# Patient Record
Sex: FEMALE | Race: WHITE | NOT HISPANIC OR LATINO | Employment: OTHER | URBAN - NONMETROPOLITAN AREA
[De-identification: names, ages, dates, MRNs, and addresses within clinical notes are randomized per-mention and may not be internally consistent; named-entity substitution may affect disease eponyms.]

---

## 2021-08-12 ENCOUNTER — OFFICE VISIT (OUTPATIENT)
Dept: CARDIOLOGY | Facility: CLINIC | Age: 81
End: 2021-08-12

## 2021-08-12 VITALS
HEART RATE: 70 BPM | WEIGHT: 156 LBS | HEIGHT: 60 IN | BODY MASS INDEX: 30.63 KG/M2 | SYSTOLIC BLOOD PRESSURE: 142 MMHG | DIASTOLIC BLOOD PRESSURE: 80 MMHG

## 2021-08-12 DIAGNOSIS — E88.81 METABOLIC SYNDROME: ICD-10-CM

## 2021-08-12 DIAGNOSIS — I25.6 SILENT MYOCARDIAL ISCHEMIA: ICD-10-CM

## 2021-08-12 DIAGNOSIS — I10 ESSENTIAL HYPERTENSION: ICD-10-CM

## 2021-08-12 DIAGNOSIS — R06.02 SHORTNESS OF BREATH: Primary | ICD-10-CM

## 2021-08-12 DIAGNOSIS — E78.00 HYPERCHOLESTEREMIA: ICD-10-CM

## 2021-08-12 DIAGNOSIS — R53.82 CHRONIC FATIGUE: ICD-10-CM

## 2021-08-12 PROBLEM — E88.810 METABOLIC SYNDROME: Status: ACTIVE | Noted: 2021-08-12

## 2021-08-12 PROCEDURE — 99204 OFFICE O/P NEW MOD 45 MIN: CPT | Performed by: INTERNAL MEDICINE

## 2021-08-12 PROCEDURE — 93000 ELECTROCARDIOGRAM COMPLETE: CPT | Performed by: INTERNAL MEDICINE

## 2021-08-12 RX ORDER — ATENOLOL 50 MG/1
TABLET ORAL
COMMUNITY

## 2021-08-12 RX ORDER — TRAMADOL HYDROCHLORIDE 50 MG/1
50 TABLET ORAL EVERY 6 HOURS PRN
COMMUNITY

## 2021-08-12 RX ORDER — ASPIRIN 81 MG/1
81 TABLET ORAL DAILY
Qty: 30 TABLET | Refills: 6 | Status: SHIPPED | OUTPATIENT
Start: 2021-08-12

## 2021-08-12 RX ORDER — DICLOFENAC SODIUM 75 MG/1
75 TABLET, DELAYED RELEASE ORAL 2 TIMES DAILY
COMMUNITY

## 2021-08-12 RX ORDER — FLUTICASONE PROPIONATE 50 MCG
2 SPRAY, SUSPENSION (ML) NASAL DAILY
COMMUNITY

## 2021-08-12 RX ORDER — LISINOPRIL AND HYDROCHLOROTHIAZIDE 25; 20 MG/1; MG/1
1 TABLET ORAL DAILY
COMMUNITY

## 2021-08-12 RX ORDER — GABAPENTIN 300 MG/1
300 CAPSULE ORAL 3 TIMES DAILY
COMMUNITY

## 2021-08-12 RX ORDER — LANSOPRAZOLE 30 MG/1
30 CAPSULE, DELAYED RELEASE ORAL DAILY
COMMUNITY

## 2021-08-12 RX ORDER — MULTIPLE VITAMINS W/ MINERALS TAB 9MG-400MCG
1 TAB ORAL DAILY
COMMUNITY

## 2021-08-12 RX ORDER — AMLODIPINE BESYLATE 5 MG/1
5 TABLET ORAL DAILY
COMMUNITY

## 2021-08-12 RX ORDER — DULOXETIN HYDROCHLORIDE 60 MG/1
60 CAPSULE, DELAYED RELEASE ORAL DAILY
COMMUNITY

## 2021-08-12 RX ORDER — LUTEIN 6 MG
TABLET ORAL DAILY
COMMUNITY

## 2021-08-12 NOTE — PROGRESS NOTES
Chief Complaint   Patient presents with   • Establish Care     PCP referred, SOB, weakness   • Shortness of Breath     with exertion, at times unable to do her chores due to the SOB, seems worse over the past few months.    • Cardiac history     stress, echo and cath, said it was done due to something hereditary her sister had involving her renal arteries 20 + years ago, was told no blockages. Unable to get copy that old.    • Stress     pt's sister had been sick and recently passed away. Feels symptoms could be related to stress/ depression.    • Labs     PCP recently checked, results in chart.    • Fatigue     extremely fatigued with minimal exertion        CARDIAC COMPLAINTS  dyspnea and fatigue      Subjective   Stefany Tellez is a 80 y.o. female came in today for her initial cardiac evaluation.  She has history of hypertension who has been under significant stress recently.  Her sister was diagnosed with Alzheimer's disease about 2 years ago and apparently deteriorated very rapidly and recently passed away.  She was taking care of her as follows her sister's  also.  She was under tremendous stress during this time and gained weight.  She started noticing shortness of breath with exertion.  It used to be on moderate exertion in the past but now in the last few months she noticed it and minimal exertion.  She is not able to do her day-to-day activities.  She also has been noticing extreme fatigue even on minimal exertion.  She denies having any chest pain but she has to stop doing anything for few minutes till she is able to proceed.  She had cardiac work-up almost 20 years ago.  At that time her sister had what appears to be renal artery dysplasia and underwent some procedures.  She was told to undergo cardiac catheterization at that time and according to her everything was normal.  She has not been followed by any cardiologist after that she did undergo some lab work recently and found to have a blood sugar  of 113.  Her renal function was normal at 74.  Her electrolytes were normal.  The total cholesterol was 161 but the LDL was 102 with an HDL of 28.  Triglyceride was 176 her TSH was normal.  Her blood count was normal.  Her platelets were mildly reduced.  She used to smoke a pack a day for 15 years but quit in .  Her mother  with hypertension and also lymphoma.  Her father  with heart disease at age of 70.  Her sister as stated above  recently with Alzheimer's disease.    History reviewed. No pertinent surgical history.    Current Outpatient Medications   Medication Sig Dispense Refill   • amLODIPine (NORVASC) 5 MG tablet Take 5 mg by mouth Daily.     • atenolol (TENORMIN) 50 MG tablet 1 1/2 tabs twice a day     • diclofenac (VOLTAREN) 75 MG EC tablet Take 75 mg by mouth 2 (Two) Times a Day.     • DULoxetine (CYMBALTA) 60 MG capsule Take 60 mg by mouth Daily.     • fluticasone (FLONASE) 50 MCG/ACT nasal spray 2 sprays into the nostril(s) as directed by provider Daily.     • gabapentin (NEURONTIN) 300 MG capsule Take 300 mg by mouth 3 (Three) Times a Day.     • lansoprazole (PREVACID) 30 MG capsule Take 30 mg by mouth Daily.     • lisinopril-hydrochlorothiazide (PRINZIDE,ZESTORETIC) 20-25 MG per tablet Take 1 tablet by mouth Daily.     • Lutein 20 MG tablet Take  by mouth Daily.     • multivitamin with minerals tablet tablet Take 1 tablet by mouth Daily.     • traMADol (ULTRAM) 50 MG tablet Take 50 mg by mouth Every 6 (Six) Hours As Needed for Moderate Pain .     • aspirin (aspirin) 81 MG EC tablet Take 1 tablet by mouth Daily. 30 tablet 6     No current facility-administered medications for this visit.           ALLERGIES:  Codeine    Past Medical History:   Diagnosis Date   • Arthritis    • Asthma    • Chronic bronchitis (CMS/HCC)    • Depression    • Diverticulitis    • Fibromyalgia    • GERD (gastroesophageal reflux disease)    • History of back surgery    • History of bilateral knee replacement   "  • History of cholecystectomy    • History of colonoscopy with polypectomy    • History of hysterectomy    • History of tonsillectomy    • Hyperlipidemia    • Hypertension    • Migraines        Social History     Tobacco Use   Smoking Status Former Smoker   • Packs/day: 1.00   • Years: 15.00   • Pack years: 15.   • Types: Cigarettes   • Quit date:    • Years since quittin.6   Smokeless Tobacco Never Used          Family History   Problem Relation Age of Onset   • Hypertension Mother    • Lymphoma Mother    • Heart attack Father    • Heart disease Father    • Alzheimer's disease Sister    • Mitral valve prolapse Sister    • Other Sister         renal artery stenosis   • Heart disease Maternal Grandfather    • No Known Problems Son    • No Known Problems Daughter        Review of Systems   Constitutional: Positive for malaise/fatigue. Negative for decreased appetite.   HENT: Negative for congestion and sore throat.    Eyes: Negative for blurred vision.   Cardiovascular: Positive for dyspnea on exertion. Negative for chest pain.   Respiratory: Positive for shortness of breath and snoring.    Endocrine: Negative for cold intolerance and heat intolerance.   Hematologic/Lymphatic: Negative for adenopathy. Does not bruise/bleed easily.   Skin: Negative for itching, nail changes and skin cancer.   Musculoskeletal: Positive for back pain and joint pain. Negative for arthritis and myalgias.   Gastrointestinal: Negative for abdominal pain, dysphagia and heartburn.   Genitourinary: Negative for bladder incontinence and frequency.   Neurological: Negative for dizziness, light-headedness, seizures and vertigo.   Psychiatric/Behavioral: Negative for altered mental status.   Allergic/Immunologic: Negative for environmental allergies and hives.       Diabetes- No  Thyroid- normal    Objective     /80 (BP Location: Right arm)   Pulse 70   Ht 152.4 cm (60\")   Wt 70.8 kg (156 lb)   BMI 30.47 kg/m²     Vitals and " nursing note reviewed.   Constitutional:       Appearance: Healthy appearance. Not in distress.   Eyes:      Conjunctiva/sclera: Conjunctivae normal.      Pupils: Pupils are equal, round, and reactive to light.   HENT:      Head: Normocephalic.   Pulmonary:      Effort: Pulmonary effort is normal.      Breath sounds: Normal breath sounds.   Cardiovascular:      PMI at left midclavicular line. Normal rate. Regular rhythm.      Murmurs: There is a grade 3/6 high frequency blowing holosystolic murmur at the apex.   Edema:     Ankle: bilateral trace edema of the ankle.     Feet: bilateral trace edema of the feet.  Abdominal:      General: Bowel sounds are normal.      Palpations: Abdomen is soft.   Musculoskeletal: Normal range of motion.      Cervical back: Normal range of motion and neck supple. Skin:     General: Skin is warm and dry.   Neurological:      Mental Status: Alert, oriented to person, place, and time and oriented to person, place and time.           ECG 12 Lead    Date/Time: 8/12/2021 1:22 PM  Performed by: Darryl Kraus MD  Authorized by: Darryl Kraus MD   Previous ECG: no previous ECG available  Rhythm: sinus rhythm  Rate: normal  QRS axis: normal    Clinical impression: normal ECG              Assessment/Plan   Patient's Body mass index is 30.47 kg/m². indicating that she is obese (BMI >30). Obesity-related health conditions include the following: hypertension and dyslipidemias. Obesity is newly identified. BMI is is above average; BMI management plan is completed. We discussed low calorie, low carb based diet program, portion control and increasing exercise..     Diagnoses and all orders for this visit:    1. Shortness of breath (Primary)  -     Stress Test With Myocardial Perfusion One Day; Future  -     Adult Transthoracic Echo Complete W/ Cont if Necessary Per Protocol; Future    2. Metabolic syndrome    3. Chronic fatigue  -     Overnight Sleep Oximetry Study; Future    4. Essential  hypertension    5. Hypercholesteremia    6. Silent myocardial ischemia  -     aspirin (aspirin) 81 MG EC tablet; Take 1 tablet by mouth Daily.  Dispense: 30 tablet; Refill: 6  -     Stress Test With Myocardial Perfusion One Day; Future    At baseline her heart rate is stable.  Her blood pressure is borderline elevated.  Her EKG shows normal sinus rhythm with sinus arrhythmia.  Her clinical examination reveals a BMI of 31.  She does have a short systolic murmur of 3/6 at the mitral area.  She does have trace pedal edema.    Regarding the shortness of breath which is the predominant symptom, it could be secondary to her weight but need to rule out other causes including cardiac.  I scheduled her to undergo an echocardiogram to evaluate the LV function, valvular structures, any wall motion abnormalities suggestive of ischemic heart disease and also evaluate for any pericardial effusion.    Regarding her metabolic syndrome, had a long talk with her about diet and weight reduction.  I did give her papers about Mediterranean diet.  I talked to her about cutting down on the carbohydrate intake.    Regarding the chronic fatigue she has, it could be related to the metabolic syndrome causing her to have sleep apnea.  I advised her to undergo a nocturnal pulse PO2 measurement.  If there is significant hypoxia, she may need for sleep study    Regarding her hypertension, it seems to be controlled with amlodipine, Prinzide and Tenormin.  She does have a borderline elevation at this time which could be secondary to her anxiety.  Will continue to monitor    Regarding her hypercholesterolemia, it is only borderline elevated.  I had a long talk with her about it.  If she can go on a strict diet then we should be able to control it with diet alone.  If she is not able to control it then she may need a statin    Regarding the possibility of silent ischemia, I talked to her about ischemic heart disease presentation in women.  I did talk  to her about the increasing shortness of breath and fatigue could be related to that also.  I scheduled her to undergo a stress test in the form of Lexiscan to rule it out.  Meanwhile I did advise her to be on an aspirin 81 mg once a day.    Regarding her advanced directive, she apparently does have a living will and power of .  I did advise her to give us a copy.    Depending on the findings, further recommendations will be made.               Electronically signed by Darryl Kraus MD August 12, 2021 13:01 EDT

## 2021-08-12 NOTE — PATIENT INSTRUCTIONS
Mediterranean Diet  A Mediterranean diet refers to food and lifestyle choices that are based on the traditions of countries located on the [place]. This way of eating has been shown to help prevent certain conditions and improve outcomes for people who have chronic diseases, like kidney disease and heart disease.  What are tips for following this plan?  Lifestyle  · Cook and eat meals together with your family, when possible.  · Drink enough fluid to keep your urine clear or pale yellow.  · Be physically active every day. This includes:  ? Aerobic exercise like running or swimming.  ? Leisure activities like gardening, walking, or housework.  · Get 7-8 hours of sleep each night.  · If recommended by your health care provider, drink red wine in moderation. This means 1 glass a day for nonpregnant women and 2 glasses a day for men. A glass of wine equals 5 oz (150 mL).  Reading food labels    · Check the serving size of packaged foods. For foods such as rice and pasta, the serving size refers to the amount of cooked product, not dry.  · Check the total fat in packaged foods. Avoid foods that have saturated fat or trans fats.  · Check the ingredients list for added sugars, such as corn syrup.  Shopping  · At the grocery store, buy most of your food from the areas near the walls of the store. This includes:  ? Fresh fruits and vegetables (produce).  ? Grains, beans, nuts, and seeds. Some of these may be available in unpackaged forms or large amounts (in bulk).  ? Fresh seafood.  ? Poultry and eggs.  ? Low-fat dairy products.  · Buy whole ingredients instead of prepackaged foods.  · Buy fresh fruits and vegetables in-season from local farmers markets.  · Buy frozen fruits and vegetables in resealable bags.  · If you do not have access to quality fresh seafood, buy precooked frozen shrimp or canned fish, such as tuna, salmon, or sardines.  · Buy small amounts of raw or cooked vegetables, salads, or olives from  the deli or salad bar at your store.  · Stock your pantry so you always have certain foods on hand, such as olive oil, canned tuna, canned tomatoes, rice, pasta, and beans.  Cooking  · Cook foods with extra-virgin olive oil instead of using butter or other vegetable oils.  · Have meat as a side dish, and have vegetables or grains as your main dish. This means having meat in small portions or adding small amounts of meat to foods like pasta or stew.  · Use beans or vegetables instead of meat in common dishes like chili or lasagna.  · [place] with different cooking methods. Try roasting or broiling vegetables instead of steaming or sautéeing them.  · Add frozen vegetables to soups, stews, pasta, or rice.  · Add nuts or seeds for added healthy fat at each meal. You can add these to yogurt, salads, or vegetable dishes.  · Marinate fish or vegetables using olive oil, lemon juice, garlic, and fresh herbs.  Meal planning    · Plan to eat 1 vegetarian meal one day each week. Try to work up to 2 vegetarian meals, if possible.  · Eat seafood 2 or more times a week.  · Have healthy snacks readily available, such as:  ? Vegetable sticks with hummus.  ? Greek yogurt.  ? Fruit and nut trail mix.  · Eat balanced meals throughout the week. This includes:  ? Fruit: 2-3 servings a day  ? Vegetables: 4-5 servings a day  ? Low-fat dairy: 2 servings a day  ? Fish, poultry, or lean meat: 1 serving a day  ? Beans and legumes: 2 or more servings a week  ? Nuts and seeds: 1-2 servings a day  ? Whole grains: 6-8 servings a day  ? Extra-virgin olive oil: 3-4 servings a day  · Limit red meat and sweets to only a few servings a month  What are my food choices?  · Mediterranean diet  ? Recommended  § Grains: Whole-grain pasta. Brown rice. Bulgar wheat. Polenta. Couscous. Whole-wheat bread. Oatmeal. Quinoa.  § Vegetables: Artichokes. Beets. Broccoli. Cabbage. Carrots. Eggplant. Green beans. Chard. Kale. Spinach. Onions. Leeks. Peas. Squash.  Tomatoes. Peppers. Radishes.  § Fruits: Apples. Apricots. Avocado. Berries. Bananas. Cherries. Dates. Figs. Grapes. Otilia. Melon. Oranges. Peaches. Plums. Pomegranate.  § Meats and other protein foods: Beans. Almonds. Sunflower seeds. Pine nuts. Peanuts. Cod. [place]. Scallops. Shrimp. Tuna. Tilapia. Clams. Oysters. Eggs.  § Dairy: Low-fat milk. Cheese. Greek yogurt.  § Beverages: Water. Red wine. Herbal tea.  § Fats and oils: Extra virgin olive oil. Avocado oil. Grape seed oil.  § Sweets and desserts: Greek yogurt with honey. Baked apples. Poached pears. Trail mix.  § Seasoning and other foods: Basil. Cilantro. Coriander. Cumin. Mint. Parsley. [place]. Rosemary. Tarragon. Garlic. Oregano. Thyme. Pepper. Balsalmic vinegar. Tahini. Hummus. Tomato sauce. Olives. Mushrooms.  ? Limit these  § Grains: Prepackaged pasta or rice dishes. Prepackaged cereal with added sugar.  § Vegetables: Deep fried potatoes (french fries).  § Fruits: Fruit canned in syrup.  § Meats and other protein foods: Beef. Pork. Lamb. Poultry with skin. Hot dogs. [place].  § Dairy: Ice cream. Sour cream. Whole milk.  § Beverages: Juice. Sugar-sweetened soft drinks. Beer. Liquor and spirits.  § Fats and oils: Butter. Canola oil. Vegetable oil. Beef fat (tallow). Lard.  § Sweets and desserts: Cookies. Cakes. Pies. Candy.  § Seasoning and other foods: Mayonnaise. Premade sauces and marinades.  The items listed may not be a complete list. Talk with your dietitian about what dietary choices are right for you.  Summary  · The Mediterranean diet includes both food and lifestyle choices.  · Eat a variety of fresh fruits and vegetables, beans, nuts, seeds, and whole grains.  · Limit the amount of red meat and sweets that you eat.  · Talk with your health care provider about whether it is safe for you to drink red wine in moderation. This means 1 glass a day for nonpregnant women and 2 glasses a day for men. A glass of wine equals 5 oz (150 mL).  This information  is not intended to replace advice given to you by your health care provider. Make sure you discuss any questions you have with your health care provider.  Document Revised: 08/17/2017 Document Reviewed: 08/10/2017  Elsevier Patient Education © 2020 [employer]

## 2021-09-13 ENCOUNTER — APPOINTMENT (OUTPATIENT)
Dept: CARDIOLOGY | Facility: HOSPITAL | Age: 81
End: 2021-09-13

## 2021-10-18 ENCOUNTER — APPOINTMENT (OUTPATIENT)
Dept: CARDIOLOGY | Facility: HOSPITAL | Age: 81
End: 2021-10-18

## 2021-11-03 ENCOUNTER — HOSPITAL ENCOUNTER (OUTPATIENT)
Dept: CARDIOLOGY | Facility: HOSPITAL | Age: 81
Discharge: HOME | End: 2021-11-03
Admitting: INTERNAL MEDICINE

## 2021-11-03 DIAGNOSIS — R06.02 SHORTNESS OF BREATH: ICD-10-CM

## 2021-11-03 LAB
BH CV ECHO MEAS - ACS: 2.2 CM
BH CV ECHO MEAS - AI DEC SLOPE: 184 CM/SEC^2
BH CV ECHO MEAS - AI MAX PG: 47.1 MMHG
BH CV ECHO MEAS - AI MAX VEL: 343 CM/SEC
BH CV ECHO MEAS - AI P1/2T: 546 MSEC
BH CV ECHO MEAS - AO MAX PG: 5.9 MMHG
BH CV ECHO MEAS - AO MEAN PG: 3 MMHG
BH CV ECHO MEAS - AO ROOT AREA (BSA CORRECTED): 1.7
BH CV ECHO MEAS - AO ROOT AREA: 6.4 CM^2
BH CV ECHO MEAS - AO ROOT DIAM: 2.9 CM
BH CV ECHO MEAS - AO V2 MAX: 121 CM/SEC
BH CV ECHO MEAS - AO V2 MEAN: 86.3 CM/SEC
BH CV ECHO MEAS - AO V2 VTI: 27.1 CM
BH CV ECHO MEAS - BSA(HAYCOCK): 1.8 M^2
BH CV ECHO MEAS - BSA: 1.7 M^2
BH CV ECHO MEAS - BZI_BMI: 30.5 KILOGRAMS/M^2
BH CV ECHO MEAS - BZI_METRIC_HEIGHT: 152.4 CM
BH CV ECHO MEAS - BZI_METRIC_WEIGHT: 70.8 KG
BH CV ECHO MEAS - EDV(CUBED): 59.8 ML
BH CV ECHO MEAS - EDV(MOD-SP4): 58.6 ML
BH CV ECHO MEAS - EDV(TEICH): 66.3 ML
BH CV ECHO MEAS - EF(CUBED): 82.2 %
BH CV ECHO MEAS - EF(MOD-SP4): 59.4 %
BH CV ECHO MEAS - EF(TEICH): 75.6 %
BH CV ECHO MEAS - ESV(CUBED): 10.6 ML
BH CV ECHO MEAS - ESV(MOD-SP4): 23.8 ML
BH CV ECHO MEAS - ESV(TEICH): 16.2 ML
BH CV ECHO MEAS - FS: 43.7 %
BH CV ECHO MEAS - IVS/LVPW: 1.2
BH CV ECHO MEAS - IVSD: 1.4 CM
BH CV ECHO MEAS - LA DIMENSION: 3.5 CM
BH CV ECHO MEAS - LA/AO: 1.2
BH CV ECHO MEAS - LV DIASTOLIC VOL/BSA (35-75): 34.9 ML/M^2
BH CV ECHO MEAS - LV IVRT: 0.14 SEC
BH CV ECHO MEAS - LV MASS(C)D: 183.6 GRAMS
BH CV ECHO MEAS - LV MASS(C)DI: 109.3 GRAMS/M^2
BH CV ECHO MEAS - LV SYSTOLIC VOL/BSA (12-30): 14.2 ML/M^2
BH CV ECHO MEAS - LVIDD: 3.9 CM
BH CV ECHO MEAS - LVIDS: 2.2 CM
BH CV ECHO MEAS - LVLD AP4: 6 CM
BH CV ECHO MEAS - LVLS AP4: 5.1 CM
BH CV ECHO MEAS - LVOT AREA (M): 3.1 CM^2
BH CV ECHO MEAS - LVOT AREA: 3.1 CM^2
BH CV ECHO MEAS - LVOT DIAM: 2 CM
BH CV ECHO MEAS - LVPWD: 1.2 CM
BH CV ECHO MEAS - MV A MAX VEL: 83.1 CM/SEC
BH CV ECHO MEAS - MV DEC SLOPE: 157 CM/SEC^2
BH CV ECHO MEAS - MV E MAX VEL: 48.8 CM/SEC
BH CV ECHO MEAS - MV E/A: 0.59
BH CV ECHO MEAS - RAP SYSTOLE: 10 MMHG
BH CV ECHO MEAS - RVDD: 2.9 CM
BH CV ECHO MEAS - RVSP: 35.8 MMHG
BH CV ECHO MEAS - SI(AO): 102.9 ML/M^2
BH CV ECHO MEAS - SI(CUBED): 29.3 ML/M^2
BH CV ECHO MEAS - SI(MOD-SP4): 20.7 ML/M^2
BH CV ECHO MEAS - SI(TEICH): 29.8 ML/M^2
BH CV ECHO MEAS - SV(AO): 172.9 ML
BH CV ECHO MEAS - SV(CUBED): 49.1 ML
BH CV ECHO MEAS - SV(MOD-SP4): 34.8 ML
BH CV ECHO MEAS - SV(TEICH): 50.1 ML
BH CV ECHO MEAS - TR MAX VEL: 254 CM/SEC
MAXIMAL PREDICTED HEART RATE: 140 BPM
STRESS TARGET HR: 119 BPM

## 2021-11-03 PROCEDURE — 93306 TTE W/DOPPLER COMPLETE: CPT

## 2021-11-03 PROCEDURE — 93306 TTE W/DOPPLER COMPLETE: CPT | Performed by: INTERNAL MEDICINE

## 2021-11-08 ENCOUNTER — HOSPITAL ENCOUNTER (OUTPATIENT)
Dept: CARDIOLOGY | Facility: HOSPITAL | Age: 81
Discharge: HOME | End: 2021-11-08

## 2021-11-08 ENCOUNTER — HOSPITAL ENCOUNTER (OUTPATIENT)
Dept: CARDIOLOGY | Facility: HOSPITAL | Age: 81
End: 2021-11-08

## 2021-11-08 DIAGNOSIS — I25.6 SILENT MYOCARDIAL ISCHEMIA: ICD-10-CM

## 2021-11-08 DIAGNOSIS — R06.02 SHORTNESS OF BREATH: ICD-10-CM

## 2021-11-08 PROCEDURE — 78452 HT MUSCLE IMAGE SPECT MULT: CPT

## 2021-11-08 PROCEDURE — 93017 CV STRESS TEST TRACING ONLY: CPT

## 2021-11-08 PROCEDURE — 78452 HT MUSCLE IMAGE SPECT MULT: CPT | Performed by: INTERNAL MEDICINE

## 2021-11-08 PROCEDURE — 0 TECHNETIUM SESTAMIBI: Performed by: INTERNAL MEDICINE

## 2021-11-08 PROCEDURE — 93018 CV STRESS TEST I&R ONLY: CPT | Performed by: INTERNAL MEDICINE

## 2021-11-08 PROCEDURE — A9500 TC99M SESTAMIBI: HCPCS | Performed by: INTERNAL MEDICINE

## 2021-11-08 RX ADMIN — TECHNETIUM TC 99M SESTAMIBI 1 DOSE: 1 INJECTION INTRAVENOUS at 09:13

## 2021-11-10 ENCOUNTER — HOSPITAL ENCOUNTER (OUTPATIENT)
Dept: CARDIOLOGY | Facility: HOSPITAL | Age: 81
Discharge: HOME | End: 2021-11-10

## 2021-11-10 LAB
BH CV REST NUCLEAR ISOTOPE DOSE: 20 MCI
BH CV STRESS COMMENTS STAGE 1: NORMAL
BH CV STRESS DOSE REGADENOSON STAGE 1: 0.4
BH CV STRESS DURATION MIN STAGE 1: 0
BH CV STRESS DURATION SEC STAGE 1: 10
BH CV STRESS NUCLEAR ISOTOPE DOSE: 20 MCI
BH CV STRESS PROTOCOL 1: NORMAL
BH CV STRESS RECOVERY BP: NORMAL MMHG
BH CV STRESS RECOVERY HR: 68 BPM
BH CV STRESS STAGE 1: 1
LV EF NUC BP: 61 %
MAXIMAL PREDICTED HEART RATE: 140 BPM
PERCENT MAX PREDICTED HR: 57.14 %
STRESS BASELINE BP: NORMAL MMHG
STRESS BASELINE HR: 63 BPM
STRESS PERCENT HR: 67 %
STRESS POST PEAK BP: NORMAL MMHG
STRESS POST PEAK HR: 80 BPM
STRESS TARGET HR: 119 BPM

## 2021-11-10 PROCEDURE — 0 TECHNETIUM SESTAMIBI: Performed by: INTERNAL MEDICINE

## 2021-11-10 PROCEDURE — A9500 TC99M SESTAMIBI: HCPCS | Performed by: INTERNAL MEDICINE

## 2021-11-10 PROCEDURE — 25010000002 REGADENOSON 0.4 MG/5ML SOLUTION: Performed by: INTERNAL MEDICINE

## 2021-11-10 RX ADMIN — REGADENOSON 0.4 MG: 0.08 INJECTION, SOLUTION INTRAVENOUS at 10:26

## 2021-11-10 RX ADMIN — TECHNETIUM TC 99M SESTAMIBI 1 DOSE: 1 INJECTION INTRAVENOUS at 10:26

## 2021-11-15 DIAGNOSIS — R94.39 ABNORMAL MYOCARDIAL PERFUSION STUDY: Primary | ICD-10-CM

## 2021-11-15 DIAGNOSIS — R06.02 SHORTNESS OF BREATH: ICD-10-CM

## 2021-11-29 ENCOUNTER — OUTSIDE FACILITY SERVICE (OUTPATIENT)
Dept: CARDIOLOGY | Facility: CLINIC | Age: 81
End: 2021-11-29

## 2021-11-29 PROCEDURE — 93458 L HRT ARTERY/VENTRICLE ANGIO: CPT | Performed by: INTERNAL MEDICINE

## 2021-11-30 DIAGNOSIS — R94.39 ABNORMAL MYOCARDIAL PERFUSION STUDY: ICD-10-CM

## 2021-11-30 DIAGNOSIS — R06.02 SHORTNESS OF BREATH: ICD-10-CM

## 2021-12-07 ENCOUNTER — TELEPHONE (OUTPATIENT)
Dept: CARDIOLOGY | Facility: CLINIC | Age: 81
End: 2021-12-07

## 2021-12-07 NOTE — TELEPHONE ENCOUNTER
Patient called, she has noticed a minor rash like area <1/2 inch, close to her cath site.  Upon discussing it, she feels like it is minor skin irritation from not wearing her Depends a few nights.  She denies redness around cath site and no warmth. No bruising.  I advised her to call if it persists or gets worse.

## 2022-01-10 ENCOUNTER — OFFICE VISIT (OUTPATIENT)
Dept: CARDIOLOGY | Facility: CLINIC | Age: 82
End: 2022-01-10

## 2022-01-10 VITALS
HEART RATE: 72 BPM | WEIGHT: 163.2 LBS | BODY MASS INDEX: 32.04 KG/M2 | DIASTOLIC BLOOD PRESSURE: 78 MMHG | SYSTOLIC BLOOD PRESSURE: 130 MMHG | TEMPERATURE: 97.6 F | HEIGHT: 60 IN

## 2022-01-10 DIAGNOSIS — I25.810 CORONARY ARTERY DISEASE INVOLVING CORONARY BYPASS GRAFT OF NATIVE HEART WITHOUT ANGINA PECTORIS: ICD-10-CM

## 2022-01-10 DIAGNOSIS — R06.02 SHORTNESS OF BREATH: ICD-10-CM

## 2022-01-10 DIAGNOSIS — I10 ESSENTIAL HYPERTENSION: Primary | ICD-10-CM

## 2022-01-10 DIAGNOSIS — E88.81 METABOLIC SYNDROME: ICD-10-CM

## 2022-01-10 PROCEDURE — 99213 OFFICE O/P EST LOW 20 MIN: CPT | Performed by: NURSE PRACTITIONER

## 2022-01-10 NOTE — PROGRESS NOTES
"Chief Complaint   Patient presents with   • Hospital follow up     Patient had cardiac cath 11/29/21 with medical management.   • Shortness of Breath     Has noticed increase in the last week, states \"it just seems to come and go\".   • Med Refill     PCP writes refills.     Subjective       Stefany Tellez is a 81 y.o. female with HTN, mild dyslipidemia, and significant arthritis who was referred for cardiac evaluation in August 2021. She had been under tremendous stress caring for her sister who rapidly deteriorated with Alzheimer's then cared for her sister's  who passed away shortly after. She denied having any chest pain but admitted to increasing shortness of breath on minimal exertion and extreme fatigue. Labs showed glucose 113, GFR 74, normal TSH, , , HDL 28, Tri 176.     Lexiscan stress test showed normal EF, questionable anterior wall ischemia. Echo revealed mild MR, mild AI, EF 65%, RVSP 36 mmHg. Cardiac cath was advised and revealed 65-70% of D1 and D2 which were 1mm vessels, too small for intervention. Medical management advised.     She returns today for follow up. Symptoms improved. She continues to have intermittent dyspnea but improved now that her stressors are lessened. Blood pressure well controlled. Activity is limited secondary to arthritis, difficulty ambulating well.         Cardiac History:    Past Surgical History:   Procedure Laterality Date   • CARDIAC CATHETERIZATION  11/29/2021    65-70% Small D! & D2   • CARDIOVASCULAR STRESS TEST  11/10/2021    Lexiscan- EF 61%. R/O Anterior Ischemia   • ECHO - CONVERTED  11/03/2021    EF 65%. Mild MR & AI. RVSP- 36 mmHg     Current Outpatient Medications   Medication Sig Dispense Refill   • amLODIPine (NORVASC) 5 MG tablet Take 5 mg by mouth Daily.     • aspirin (aspirin) 81 MG EC tablet Take 1 tablet by mouth Daily. 30 tablet 6   • atenolol (TENORMIN) 50 MG tablet 1 1/2 tabs twice a day     • diclofenac (VOLTAREN) 75 MG EC tablet Take " 75 mg by mouth 2 (Two) Times a Day.     • DULoxetine (CYMBALTA) 60 MG capsule Take 60 mg by mouth Daily.     • fluticasone (FLONASE) 50 MCG/ACT nasal spray 2 sprays into the nostril(s) as directed by provider Daily.     • gabapentin (NEURONTIN) 300 MG capsule Take 300 mg by mouth 3 (Three) Times a Day.     • lansoprazole (PREVACID) 30 MG capsule Take 30 mg by mouth Daily.     • lisinopril-hydrochlorothiazide (PRINZIDE,ZESTORETIC) 20-25 MG per tablet Take 1 tablet by mouth Daily.     • Lutein 20 MG tablet Take  by mouth Daily.     • multivitamin with minerals tablet tablet Take 1 tablet by mouth Daily.     • traMADol (ULTRAM) 50 MG tablet Take 50 mg by mouth Every 6 (Six) Hours As Needed for Moderate Pain .       No current facility-administered medications for this visit.     Codeine and Sulfa antibiotics    Past Medical History:   Diagnosis Date   • Arthritis    • Asthma    • Chronic bronchitis (HCC)    • Depression    • Diverticulitis    • Fibromyalgia    • GERD (gastroesophageal reflux disease)    • History of back surgery    • History of bilateral knee replacement    • History of cholecystectomy    • History of colonoscopy with polypectomy    • History of hysterectomy    • History of tonsillectomy    • Hyperlipidemia    • Hypertension    • Migraines      Social History     Socioeconomic History   • Marital status:    Tobacco Use   • Smoking status: Former Smoker     Packs/day: 1.00     Years: 15.00     Pack years: 15.00     Types: Cigarettes     Quit date:      Years since quittin.0   • Smokeless tobacco: Never Used   Vaping Use   • Vaping Use: Never used   Substance and Sexual Activity   • Alcohol use: Never   • Drug use: Never     Family History   Problem Relation Age of Onset   • Hypertension Mother    • Lymphoma Mother    • Heart attack Father    • Heart disease Father    • Alzheimer's disease Sister    • Mitral valve prolapse Sister    • Other Sister         renal artery stenosis   • Heart  "disease Maternal Grandfather    • No Known Problems Son    • No Known Problems Daughter      Review of Systems   Constitutional: Positive for weight gain (+7). Negative for decreased appetite and malaise/fatigue.   HENT: Negative.    Eyes: Negative for blurred vision.   Cardiovascular: Positive for dyspnea on exertion. Negative for chest pain, leg swelling, palpitations and syncope.   Respiratory: Positive for shortness of breath. Negative for sleep disturbances due to breathing.    Endocrine: Negative.    Hematologic/Lymphatic: Negative for bleeding problem. Does not bruise/bleed easily.   Skin: Negative.    Musculoskeletal: Negative for falls and myalgias.   Gastrointestinal: Negative for abdominal pain, heartburn and melena.   Genitourinary: Negative for hematuria.   Neurological: Negative for dizziness and light-headedness.   Psychiatric/Behavioral: Negative for altered mental status.   Allergic/Immunologic: Negative.       Objective     /78 (BP Location: Right arm)   Pulse 72   Temp 97.6 °F (36.4 °C)   Ht 152.4 cm (60\")   Wt 74 kg (163 lb 3.2 oz)   BMI 31.87 kg/m²     Vitals and nursing note reviewed.   Constitutional:       General: Not in acute distress.     Appearance: Well-developed. Not diaphoretic.   Eyes:      Pupils: Pupils are equal, round, and reactive to light.   HENT:      Head: Normocephalic.   Pulmonary:      Effort: Pulmonary effort is normal. No respiratory distress.      Breath sounds: Normal breath sounds.   Cardiovascular:      Normal rate. Regular rhythm.   Pulses:     Intact distal pulses.   Abdominal:      General: Bowel sounds are normal.      Palpations: Abdomen is soft.   Musculoskeletal: Normal range of motion.      Cervical back: Normal range of motion. Skin:     General: Skin is warm and dry.   Neurological:      Mental Status: Alert and oriented to person, place, and time.        Procedures          Problem List Items Addressed This Visit        Cardiac and Vasculature    " Essential hypertension - Primary    Coronary artery disease involving coronary bypass graft of native heart without angina pectoris       Endocrine and Metabolic    Metabolic syndrome       Pulmonary and Pneumonias    Shortness of breath         1. HTN- well controlled, continued atenolol, amlodipine, lisinopril/HCTZ. Continue same plan. Low Na diet.     2. Mild dyslipidemia/metabolic syndrome- lipid panel reviewed, , HDL 28, Tri 176. She declines medical therapy. Low cholesterol, low CHO diet. Mediterranean style diet recommended.     3. CAD- cardiac cath reviewed with her. Continue low dose aspirin, bb, CCB, ACE. She is not taking statin.     Patient's Body mass index is 31.87 kg/m². Heart healthy diet encouraged. She appears stable. No changes made. FU in 6 months or sooner as needed.             Electronically signed by JIGAR Campos,  January 12, 2022 21:35 EST

## 2022-01-12 PROBLEM — I25.810 CORONARY ARTERY DISEASE INVOLVING CORONARY BYPASS GRAFT OF NATIVE HEART WITHOUT ANGINA PECTORIS: Status: ACTIVE | Noted: 2022-01-12

## 2023-11-02 ENCOUNTER — TELEPHONE (OUTPATIENT)
Dept: CARDIOLOGY | Facility: CLINIC | Age: 83
End: 2023-11-02
Payer: MEDICARE

## 2023-11-02 ENCOUNTER — OFFICE VISIT (OUTPATIENT)
Dept: CARDIOLOGY | Facility: CLINIC | Age: 83
End: 2023-11-02
Payer: MEDICARE

## 2023-11-02 VITALS
DIASTOLIC BLOOD PRESSURE: 84 MMHG | BODY MASS INDEX: 28.86 KG/M2 | WEIGHT: 147 LBS | SYSTOLIC BLOOD PRESSURE: 126 MMHG | HEART RATE: 98 BPM | HEIGHT: 60 IN

## 2023-11-02 DIAGNOSIS — I48.91 ATRIAL FIBRILLATION, CONTROLLED: Primary | ICD-10-CM

## 2023-11-02 DIAGNOSIS — I25.810 CORONARY ARTERY DISEASE INVOLVING CORONARY BYPASS GRAFT OF NATIVE HEART WITHOUT ANGINA PECTORIS: ICD-10-CM

## 2023-11-02 DIAGNOSIS — R06.02 INCREASING SHORTNESS OF BREATH: ICD-10-CM

## 2023-11-02 DIAGNOSIS — E78.5 DYSLIPIDEMIA: ICD-10-CM

## 2023-11-02 DIAGNOSIS — I10 ESSENTIAL HYPERTENSION: ICD-10-CM

## 2023-11-02 DIAGNOSIS — R06.02 SHORTNESS OF BREATH: ICD-10-CM

## 2023-11-02 NOTE — PROGRESS NOTES
Chief Complaint   Patient presents with    Follow-up     Cardiac management    Lab     Last labs yesterday at .     Cardiac clearance     She is needing clearance to have malignant melanoma on 11/9/23. She was doing pre op yesterday at , had abnormal EKG.    Weight loss     Has had loss of appetite, not eating out as often.    Shortness of Breath     At times she will be sitting around and feels like she has to get a deep breath.        HPI:  ISAAK Tellez is a 82 y.o. female with HTN, mild dyslipidemia, and significant arthritis who was referred for cardiac evaluation in August 2021. She had been under tremendous stress caring for her sister who rapidly deteriorated with Alzheimer's then cared for her sister's  who passed away shortly after. She denied having any chest pain but admitted to increasing shortness of breath on minimal exertion and extreme fatigue.      11/2021, Lexiscan stress test showed normal EF, questionable anterior wall ischemia. Echo revealed mild MR, mild AI, EF 65%, RVSP 36 mmHg. Cardiac cath was advised and revealed 65-70% of D1 and D2 which were 1mm vessels, too small for intervention. Medical management advised.      She returns today for follow up. Patient denies chest pain, pressure, palpitations, tightness, dizziness, shortness of air.  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  She is requesting cardiac clearance.  EKG in office today showed A-fib with a regular rate of 98 bpm.  This is a new finding.      Cardiac History:    Past Surgical History:   Procedure Laterality Date    CARDIAC CATHETERIZATION  11/29/2021    65-70% Small D! & D2    CARDIOVASCULAR STRESS TEST  11/10/2021    Lexiscan- EF 61%. R/O Anterior Ischemia    ECHO - CONVERTED  11/03/2021    EF 65%. Mild MR & AI. RVSP- 36 mmHg       Current Outpatient Medications   Medication Sig Dispense Refill    amLODIPine (NORVASC) 5 MG tablet Take 1 tablet by  mouth Daily.      atenolol (TENORMIN) 50 MG tablet 1 1/2 tabs twice a day      diclofenac (VOLTAREN) 75 MG EC tablet Take 1 tablet by mouth 2 (Two) Times a Day.      DULoxetine (CYMBALTA) 60 MG capsule Take 1 capsule by mouth Daily.      gabapentin (NEURONTIN) 300 MG capsule Take 1 capsule by mouth 3 (Three) Times a Day.      lansoprazole (PREVACID) 30 MG capsule Take 1 capsule by mouth Daily.      lisinopril-hydrochlorothiazide (PRINZIDE,ZESTORETIC) 20-25 MG per tablet Take 1 tablet by mouth Daily.      Lutein 20 MG tablet Take  by mouth Daily.      multivitamin with minerals tablet tablet Take 1 tablet by mouth Daily.      traMADol (ULTRAM) 50 MG tablet Take 1 tablet by mouth Every 6 (Six) Hours As Needed for Moderate Pain.      rivaroxaban (XARELTO) 20 MG tablet Take 1 tablet by mouth Daily. 30 tablet 2     No current facility-administered medications for this visit.       Codeine, Novocain [procaine], and Sulfa antibiotics    Past Medical History:   Diagnosis Date    Arthritis     Asthma     Cancer     malignant melanoma    Chronic bronchitis     Depression     Diverticulitis     Fibromyalgia     GERD (gastroesophageal reflux disease)     History of back surgery     History of bilateral knee replacement     History of cholecystectomy     History of colonoscopy with polypectomy     History of hysterectomy     History of tonsillectomy     Hyperlipidemia     Hypertension     Migraines        Social History     Socioeconomic History    Marital status:    Tobacco Use    Smoking status: Former     Packs/day: 1.00     Years: 15.00     Additional pack years: 0.00     Total pack years: 15.00     Types: Cigarettes     Quit date:      Years since quittin.8    Smokeless tobacco: Never   Vaping Use    Vaping Use: Never used   Substance and Sexual Activity    Alcohol use: Never    Drug use: Never    Sexual activity: Defer       Family History   Problem Relation Age of Onset    Hypertension Mother     Lymphoma  "Mother     Heart attack Father     Heart disease Father     Alzheimer's disease Sister     Mitral valve prolapse Sister     Other Sister         renal artery stenosis    Heart disease Maternal Grandfather     No Known Problems Son     No Known Problems Daughter        Vitals:   /84 (BP Location: Right arm)   Pulse 98   Ht 152.4 cm (60\")   Wt 66.7 kg (147 lb)   BMI 28.71 kg/m²     Physical Exam  Vitals and nursing note reviewed.   Neck:      Vascular: No carotid bruit.   Cardiovascular:      Rate and Rhythm: Normal rate. Rhythm irregularly irregular.      Pulses: Normal pulses.      Heart sounds: Normal heart sounds. No murmur heard.     No friction rub. No gallop.   Pulmonary:      Effort: Pulmonary effort is normal.      Breath sounds: Normal breath sounds and air entry.   Musculoskeletal:      Right lower leg: No edema.      Left lower leg: No edema.   Skin:     General: Skin is warm and dry.      Capillary Refill: Capillary refill takes less than 2 seconds.   Neurological:      Mental Status: She is alert and oriented to person, place, and time.         ECG 12 Lead    Date/Time: 11/2/2023 4:29 PM  Performed by: Haydee Chu APRN    Authorized by: Haydee Chu APRN  Comparison: compared with previous ECG from 11/1/2023  Similar to previous ECG  Rhythm: atrial fibrillation  Rate: normal  BPM: 98    Clinical impression: abnormal EKG  Comments: QTc 378 ms           Assessment & Plan     Diagnoses and all orders for this visit:    1. Atrial fibrillation, controlled (Primary)  -     Cancel: Adult Transthoracic Echo Complete W/ Cont if Necessary Per Protocol; Future  -     rivaroxaban (XARELTO) 20 MG tablet; Take 1 tablet by mouth Daily.  Dispense: 30 tablet; Refill: 2  -     ECG 12 Lead    2. Essential hypertension    3. Coronary artery disease involving coronary bypass graft of native heart without angina pectoris    4. Dyslipidemia    5. Increasing shortness of breath  -     Cancel: Adult Transthoracic " Echo Complete W/ Cont if Necessary Per Protocol; Future    Atrial fibrillation/SOB  - New finding.  Asymptomatic.  Rate controlled  - EKG in office today showed A-fib with rate of 98 bpm.  Normal QTc  - Begin anticoagulant Xarelto 20 mg  - Order echocardiogram to evaluate left ventricular function    HTN  -BP well controlled  - Continue calcium channel blocker, beta-blocker, ACE, HCTZ    Dyslipidemia  - Labs monitored by PCP recent lipid panel not available for review today  - Continue to manage with lifestyle.  Mediterranean diet recommended    New finding of atrial fibrillation rate controlled and asymptomatic.  Cardiac clearance needed for surgery next week. discussed with Dr. Kraus and ordered stat echocardiogram and scheduled for tomorrow as well as TSH and magnesium. Begin anticoagulant Xarelto today and hold 2 days before surgery and then begin again after surgery.      Visit Diagnoses:    ICD-10-CM ICD-9-CM   1. Atrial fibrillation, controlled  I48.91 427.31   2. Essential hypertension  I10 401.9   3. Coronary artery disease involving coronary bypass graft of native heart without angina pectoris  I25.810 414.05   4. Dyslipidemia  E78.5 272.4   5. Increasing shortness of breath  R06.02 786.05       Meds Ordered During Visit:  New Medications Ordered This Visit   Medications    rivaroxaban (XARELTO) 20 MG tablet     Sig: Take 1 tablet by mouth Daily.     Dispense:  30 tablet     Refill:  2       Follow Up Appointment:   Return in about 6 months (around 5/2/2024), or if symptoms worsen or fail to improve.           This document has been electronically signed by JIGAR Cortez  November 2, 2023 16:42 EDT    Dictated Utilizing Dragon Dictation: Part of this note may be an electronic transcription/translation of spoken language to printed text using the Dragon Dictation System.

## 2023-11-02 NOTE — TELEPHONE ENCOUNTER
Patient is aware to start Xarelto 20 mg daily, echo tomorrow at 2 pm, labs done yesterday at , will check on TSH and mag.     She will stop Xarelto 2 days prior to surgery.      did not order TSH or mag, will order for tomorrow

## 2023-11-02 NOTE — TELEPHONE ENCOUNTER
Pt diagnosed with malignant melanoma of vulva. Surgery scheduled 11/9/23 (in 7 days).    Preop EKG yesterday showed AF, controlled rate. New finding. She is completely asymptomatic.     They sent her here today for cardiac clearance.     So, do we start NOAC for 5 days and hold 2 days prior or have her wait until after surgery to start?     Cath 11/2021, 65% D1 and 2, <1mm vessels. EF 65%.    And do you think she needs echo?

## 2023-11-03 ENCOUNTER — HOSPITAL ENCOUNTER (OUTPATIENT)
Dept: CARDIOLOGY | Facility: HOSPITAL | Age: 83
Discharge: HOME | End: 2023-11-03
Payer: MEDICARE

## 2023-11-03 VITALS — HEIGHT: 60 IN | WEIGHT: 147.05 LBS | BODY MASS INDEX: 28.87 KG/M2

## 2023-11-03 DIAGNOSIS — I48.91 ATRIAL FIBRILLATION, CONTROLLED: ICD-10-CM

## 2023-11-03 DIAGNOSIS — R06.02 SHORTNESS OF BREATH: ICD-10-CM

## 2023-11-03 DIAGNOSIS — I10 ESSENTIAL HYPERTENSION: ICD-10-CM

## 2023-11-03 PROCEDURE — 93306 TTE W/DOPPLER COMPLETE: CPT

## 2023-11-05 LAB
AORTIC DIMENSIONLESS INDEX: 0.65 (DI)
BH CV ECHO MEAS - ACS: 1.78 CM
BH CV ECHO MEAS - AI P1/2T: 748.2 MSEC
BH CV ECHO MEAS - AO MAX PG: 4.2 MMHG
BH CV ECHO MEAS - AO MEAN PG: 2.25 MMHG
BH CV ECHO MEAS - AO ROOT DIAM: 2.6 CM
BH CV ECHO MEAS - AO V2 MAX: 102.4 CM/SEC
BH CV ECHO MEAS - AO V2 VTI: 19 CM
BH CV ECHO MEAS - EDV(CUBED): 76.7 ML
BH CV ECHO MEAS - EF(MOD-BP): 54 %
BH CV ECHO MEAS - EF_3D-VOL: 57 %
BH CV ECHO MEAS - ESV(CUBED): 29 ML
BH CV ECHO MEAS - FS: 27.7 %
BH CV ECHO MEAS - IVS/LVPW: 1.04 CM
BH CV ECHO MEAS - IVSD: 1.11 CM
BH CV ECHO MEAS - LA A2CS (ATRIAL LENGTH): 6.9 CM
BH CV ECHO MEAS - LA A4C LENGTH: 6.5 CM
BH CV ECHO MEAS - LA DIMENSION: 4.1 CM
BH CV ECHO MEAS - LAT PEAK E' VEL: 9.8 CM/SEC
BH CV ECHO MEAS - LV MASS(C)D: 157.3 GRAMS
BH CV ECHO MEAS - LV MAX PG: 1.78 MMHG
BH CV ECHO MEAS - LV MEAN PG: 0.93 MMHG
BH CV ECHO MEAS - LV V1 MAX: 66.8 CM/SEC
BH CV ECHO MEAS - LV V1 VTI: 14.4 CM
BH CV ECHO MEAS - LVIDD: 4.2 CM
BH CV ECHO MEAS - LVIDS: 3.1 CM
BH CV ECHO MEAS - LVPWD: 1.06 CM
BH CV ECHO MEAS - MED PEAK E' VEL: 6.9 CM/SEC
BH CV ECHO MEAS - MV DEC SLOPE: 529.1 CM/SEC2
BH CV ECHO MEAS - MV DEC TIME: 0.16 SEC
BH CV ECHO MEAS - MV E MAX VEL: 81.4 CM/SEC
BH CV ECHO MEAS - MV MAX PG: 3.4 MMHG
BH CV ECHO MEAS - MV MEAN PG: 1.37 MMHG
BH CV ECHO MEAS - MV P1/2T: 57.8 MSEC
BH CV ECHO MEAS - MV V2 VTI: 14.1 CM
BH CV ECHO MEAS - MVA(P1/2T): 3.8 CM2
BH CV ECHO MEAS - PA V2 MAX: 75.3 CM/SEC
BH CV ECHO MEAS - RAP SYSTOLE: 8 MMHG
BH CV ECHO MEAS - RV MAX PG: 0.85 MMHG
BH CV ECHO MEAS - RV V1 MAX: 46 CM/SEC
BH CV ECHO MEAS - RV V1 VTI: 10.5 CM
BH CV ECHO MEAS - RVDD: 3.2 CM
BH CV ECHO MEAS - RVSP: 39.6 MMHG
BH CV ECHO MEAS - TAPSE (>1.6): 1.88 CM
BH CV ECHO MEAS - TR MAX PG: 31.6 MMHG
BH CV ECHO MEAS - TR MAX VEL: 281.1 CM/SEC
BH CV ECHO MEASUREMENTS AVERAGE E/E' RATIO: 9.75
BH CV XLRA - TDI S': 10.7 CM/SEC
LEFT ATRIUM VOLUME INDEX: 49.9 ML/M2
LEFT ATRIUM VOLUME: 82 ML
SINUS: 2.7 CM

## 2023-12-11 ENCOUNTER — TELEPHONE (OUTPATIENT)
Dept: CARDIOLOGY | Facility: CLINIC | Age: 83
End: 2023-12-11
Payer: MEDICARE

## 2023-12-11 NOTE — TELEPHONE ENCOUNTER
Caller: EDE CLARK    Relationship:[employer]    Callback number: [phone]   Is it ok to leave a message: [x] Yes [] No    Requested medication for samples: XARELTO  20MG    How much medication does the patient currently have left: 3-4    Who will be picking up the samples: EDE CLARK    Do you need information about patient financial assistance for this medication: [x] Yes [] No    Additional details provided:

## 2023-12-19 DIAGNOSIS — I48.91 ATRIAL FIBRILLATION, CONTROLLED: ICD-10-CM

## 2023-12-19 NOTE — TELEPHONE ENCOUNTER
Received call from  Alex requesting refills on Xarelto 20 mg daily. Asking for the refills to be sent to [facility].

## 2023-12-22 ENCOUNTER — TELEPHONE (OUTPATIENT)
Dept: CARDIOLOGY | Facility: CLINIC | Age: 83
End: 2023-12-22
Payer: MEDICARE

## 2023-12-22 NOTE — TELEPHONE ENCOUNTER
Spoke with  Alex, patient is now in savings program for Xarelto. Requesting script for xarelto to be sent to [place] [facility] at [phone].

## 2023-12-22 NOTE — TELEPHONE ENCOUNTER
Caller: EDE CLARK     Relationship: Emergency Contact     Best call back number: [phone]     What form or medical record are you requesting: RX FOR rivaroxaban (XARELTO) 20 MG tablet [[id number]] (Order [id number])    Who is requesting this form or medical record from you: [facility]     How would you like to receive the form or medical records (pick-up, mail, fax): FAX   If fax, what is the fax number: [phone]  PHONE NUMBER [phone]    Timeframe paperwork needed: [facility] WILL BE CALLING TODAY     Additional notes:  OFFICE PERSIBED THE PT rivaroxaban (XARELTO) 20 MG tablet [[id number]] (Order [id number]). GOT OFF THE PHONE WITH PHARM, TRYING TO GET THE PT INTO A SAVINGS PROGRAM WITH [facility]. TOLD CALLER TO PROVIDER OUR OFFICE WITH THE FAX NUMBER FOR THE RX TO BE SENT TO THEM.

## 2024-01-02 ENCOUNTER — TELEPHONE (OUTPATIENT)
Dept: CARDIOLOGY | Facility: CLINIC | Age: 84
End: 2024-01-02
Payer: MEDICARE

## 2024-01-02 DIAGNOSIS — I48.91 ATRIAL FIBRILLATION, CONTROLLED: ICD-10-CM

## 2024-01-02 NOTE — TELEPHONE ENCOUNTER
[facility] pharmacy (patient is enrolled in a Savings program with Xarelto) called for Xarelto script.  Fax #[phone]  Phone # [phone]    [facility] pharmacy does show can ERX.  Script ERX to [facility].

## 2024-01-08 ENCOUNTER — LAB (OUTPATIENT)
Dept: LAB | Facility: HOSPITAL | Age: 84
End: 2024-01-08
Payer: MEDICARE

## 2024-01-08 DIAGNOSIS — I48.91 ATRIAL FIBRILLATION, CONTROLLED: ICD-10-CM

## 2024-01-08 LAB
MAGNESIUM SERPL-MCNC: 1.8 MG/DL (ref 1.6–2.4)
TSH SERPL DL<=0.05 MIU/L-ACNC: 0.64 UIU/ML (ref 0.27–4.2)

## 2024-01-08 PROCEDURE — 84443 ASSAY THYROID STIM HORMONE: CPT | Performed by: NURSE PRACTITIONER

## 2024-01-08 PROCEDURE — 83735 ASSAY OF MAGNESIUM: CPT | Performed by: NURSE PRACTITIONER

## 2024-03-26 ENCOUNTER — CLINICAL SUPPORT (OUTPATIENT)
Dept: CARDIOLOGY | Facility: CLINIC | Age: 84
End: 2024-03-26
Payer: MEDICARE

## 2024-03-26 ENCOUNTER — TELEPHONE (OUTPATIENT)
Dept: CARDIOLOGY | Facility: CLINIC | Age: 84
End: 2024-03-26
Payer: MEDICARE

## 2024-03-26 DIAGNOSIS — I48.91 ATRIAL FIBRILLATION, CONTROLLED: Primary | ICD-10-CM

## 2024-03-26 NOTE — TELEPHONE ENCOUNTER
Spoke with  Alex. Patient was in [place] today at MD appointment with HR of 128. She has been extremely weak, and tired. Having difficultly getting in car or taking a bath from weakness. Has been short of breath with minimal exertion. Has noticed at home recently heart rate has been over 100.    Patient to come to office for EKG today.

## 2024-03-26 NOTE — TELEPHONE ENCOUNTER
Caller: EDE CLARK     Relationship: [unfilled]     Best call back number: [phone]    What is your medical concern? FATIGUE, SINCE TAKING XARELTO 20 MG    How long has this issue been going on? SINCE LAST YEAR    Is your provider already aware of this issue? NO    Have you been treated for this issue?  NO

## 2024-03-26 NOTE — TELEPHONE ENCOUNTER
Alex made aware of EKG results and recommendations to change atenolol to sotalol 40 mg bid and EKG on Friday at 9:15 am. Alex voiced understanding.

## 2024-03-27 PROCEDURE — 93000 ELECTROCARDIOGRAM COMPLETE: CPT | Performed by: NURSE PRACTITIONER

## 2024-03-27 NOTE — PROGRESS NOTES
Procedure     ECG 12 Lead    Date/Time: 3/27/2024 11:22 AM  Performed by: Pati Dean APRN    Authorized by: Pati Dean APRN  Comparison: compared with previous ECG   Rhythm: atrial fibrillation  Rate: normal  BPM: 105  ST Segments: ST segments normal    Clinical impression: abnormal EKG  Comments: QTc 422 ms

## 2024-03-29 ENCOUNTER — TELEPHONE (OUTPATIENT)
Dept: CARDIOLOGY | Facility: CLINIC | Age: 84
End: 2024-03-29

## 2024-03-29 ENCOUNTER — CLINICAL SUPPORT (OUTPATIENT)
Dept: CARDIOLOGY | Facility: CLINIC | Age: 84
End: 2024-03-29
Payer: MEDICARE

## 2024-03-29 DIAGNOSIS — I48.0 PAF (PAROXYSMAL ATRIAL FIBRILLATION): Primary | ICD-10-CM

## 2024-03-29 PROCEDURE — 93000 ELECTROCARDIOGRAM COMPLETE: CPT | Performed by: NURSE PRACTITIONER

## 2024-03-29 RX ORDER — DIGOXIN 125 MCG
125 TABLET ORAL DAILY
Qty: 30 TABLET | Refills: 11 | Status: SHIPPED | OUTPATIENT
Start: 2024-03-29

## 2024-03-29 RX ORDER — LISINOPRIL AND HYDROCHLOROTHIAZIDE 20; 12.5 MG/1; MG/1
1 TABLET ORAL DAILY
Qty: 90 TABLET | Refills: 2 | Status: SHIPPED | OUTPATIENT
Start: 2024-03-29

## 2024-03-29 NOTE — PROGRESS NOTES
Procedure     ECG 12 Lead    Date/Time: 3/29/2024 10:58 AM  Performed by: Jannie Grimaldo APRN    Authorized by: Jannie Grimaldo APRN  Comparison: compared with previous ECG   Rhythm: atrial fibrillation  BPM: 114    Clinical impression: abnormal EKG  Comments: Normal QT

## 2024-03-29 NOTE — TELEPHONE ENCOUNTER
Alex made aware of EKG results and recommendations to add Digoxin 125 mcg daily, and decrease Zestoretic to 20 -12.5 mg daily. Aware of appointment for EKG on 4/8/24 at 9 am. Alex voiced understanding.

## 2024-03-29 NOTE — TELEPHONE ENCOUNTER
Add digoxin at 125mcg daily for now, may be able to stop after spring break, trying to slow down her HR without dropping BP. She may have to increase sotalol to 80mg after the break and d/c the digoxin. Decrease zestoretic to 20/12.5mg. Come in after spring break for EKG. Meds have been adjusted, at [facility].

## 2024-04-08 ENCOUNTER — CLINICAL SUPPORT (OUTPATIENT)
Dept: CARDIOLOGY | Facility: CLINIC | Age: 84
End: 2024-04-08
Payer: MEDICARE

## 2024-04-08 ENCOUNTER — TELEPHONE (OUTPATIENT)
Dept: CARDIOLOGY | Facility: CLINIC | Age: 84
End: 2024-04-08

## 2024-04-08 DIAGNOSIS — I48.0 PAF (PAROXYSMAL ATRIAL FIBRILLATION): Primary | ICD-10-CM

## 2024-04-08 PROCEDURE — 93000 ELECTROCARDIOGRAM COMPLETE: CPT | Performed by: NURSE PRACTITIONER

## 2024-04-08 NOTE — TELEPHONE ENCOUNTER
REQUEST FOR CARDIAC CLEARANCE    Caller name: SILVANO WITH [facility]    Phone Number: [phone]     Surgeon's name: DR.SUSAN MCCORD    Type of planned surgery: EXTRACTIONS AND IMPLANTS FOR TWO FRONT TEETH    Date of planned surgery: NOT SET YET    Type of anesthesia: LOCAL NUMBING    Have you been experiencing chest pain or shortness of breath? NOT THAT THE PROVIDER IS AWARE OF    Is your doctor requesting for you to stop any of your medications prior to your surgery? XARELTO    Where should we fax the clearance to? [phone]

## 2024-04-08 NOTE — PROGRESS NOTES
Procedure     ECG 12 Lead    Date/Time: 4/8/2024 12:16 PM  Performed by: Pati Dean APRN    Authorized by: Pati Dean APRN  Comparison: compared with previous ECG   Rhythm: atrial fibrillation  Rate: normal  BPM: 89  ST Segments: ST segments normal    Clinical impression: abnormal EKG  Comments: QTc 369 ms

## 2024-04-08 NOTE — TELEPHONE ENCOUNTER
EKG showed AF, rate improved from 114 to 89 with addition of digoxin.      QT is normal at 369.    Can she increase sotalol to 80 mg BID, return on Wed for EKG.    Tylenol arthritis is okay to take

## 2024-04-08 NOTE — TELEPHONE ENCOUNTER
Patient and patient spouse made aware of test results and replied in understanding. Will be needing refill sent to [facility] pharmacy.

## 2024-04-10 ENCOUNTER — TELEPHONE (OUTPATIENT)
Dept: CARDIOLOGY | Facility: CLINIC | Age: 84
End: 2024-04-10

## 2024-04-10 ENCOUNTER — CLINICAL SUPPORT (OUTPATIENT)
Dept: CARDIOLOGY | Facility: CLINIC | Age: 84
End: 2024-04-10
Payer: MEDICARE

## 2024-04-10 DIAGNOSIS — I48.0 PAF (PAROXYSMAL ATRIAL FIBRILLATION): Primary | ICD-10-CM

## 2024-04-10 DIAGNOSIS — I48.91 ATRIAL FIBRILLATION, CONTROLLED: ICD-10-CM

## 2024-04-10 PROCEDURE — 93000 ELECTROCARDIOGRAM COMPLETE: CPT | Performed by: NURSE PRACTITIONER

## 2024-04-10 NOTE — PROGRESS NOTES
No chief complaint on file.      Carlos Tellez is a 83 y.o. female         Cardiac History:    Past Surgical History:   Procedure Laterality Date    CARDIAC CATHETERIZATION  11/29/2021    65-70% Small D! & D2    CARDIOVASCULAR STRESS TEST  11/10/2021    Lexiscan- EF 61%. R/O Anterior Ischemia    ECHO - CONVERTED  11/03/2021    EF 65%. Mild MR & AI. RVSP- 36 mmHg    ECHO - CONVERTED  11/03/2023    EF 60%. LA- 4.1 .Mild MR & AI. RVSP- 40 mmHg       Current Outpatient Medications   Medication Sig Dispense Refill    amLODIPine (NORVASC) 5 MG tablet Take 1 tablet by mouth Daily.      diclofenac (VOLTAREN) 75 MG EC tablet Take 1 tablet by mouth 2 (Two) Times a Day.      digoxin (LANOXIN) 125 MCG tablet Take 1 tablet by mouth Daily. 30 tablet 11    DULoxetine (CYMBALTA) 60 MG capsule Take 1 capsule by mouth Daily.      gabapentin (NEURONTIN) 300 MG capsule Take 1 capsule by mouth 3 (Three) Times a Day.      lansoprazole (PREVACID) 30 MG capsule Take 1 capsule by mouth Daily.      lisinopril-hydrochlorothiazide (Zestoretic) 20-12.5 MG per tablet Take 1 tablet by mouth Daily. 90 tablet 2    Lutein 20 MG tablet Take  by mouth Daily.      multivitamin with minerals tablet tablet Take 1 tablet by mouth Daily.      rivaroxaban (XARELTO) 20 MG tablet Take 1 tablet by mouth Daily. 90 tablet 3    Sotalol HCl AF 80 MG tablet TAKE 1 TABLET TWICE A DAY 60 tablet 11    traMADol (ULTRAM) 50 MG tablet Take 1 tablet by mouth Every 6 (Six) Hours As Needed for Moderate Pain.       No current facility-administered medications for this visit.       Codeine, Novocain [procaine], and Sulfa antibiotics    Past Medical History:   Diagnosis Date    Arthritis     Asthma     Cancer     malignant melanoma    Chronic bronchitis     Depression     Diverticulitis     Fibromyalgia     GERD (gastroesophageal reflux disease)     History of back surgery     History of bilateral knee replacement     History of cholecystectomy     History of  "colonoscopy with polypectomy     History of hysterectomy     History of tonsillectomy     Hyperlipidemia     Hypertension     Migraines        Social History     Socioeconomic History    Marital status:    Tobacco Use    Smoking status: Former     Current packs/day: 0.00     Average packs/day: 1 pack/day for 15.0 years (15.0 ttl pk-yrs)     Types: Cigarettes     Start date:      Quit date:      Years since quittin.2    Smokeless tobacco: Never   Vaping Use    Vaping status: Never Used   Substance and Sexual Activity    Alcohol use: Never    Drug use: Never    Sexual activity: Defer       Family History   Problem Relation Age of Onset    Hypertension Mother     Lymphoma Mother     Heart attack Father     Heart disease Father     Alzheimer's disease Sister     Mitral valve prolapse Sister     Other Sister         renal artery stenosis    Heart disease Maternal Grandfather     No Known Problems Son     No Known Problems Daughter        ROS     Objective     There were no vitals taken for this visit.    Physical Exam     Procedures          Problem List Items Addressed This Visit    None       {BMI is >= 25 and <30. (Overweight) The following options were offered after discussion;:[id number]}       Stefany Tellez  reports that she quit smoking about 39 years ago. Her smoking use included cigarettes. She started smoking about 54 years ago. She has a 15 pack-year smoking history. She has never used smokeless tobacco. I have educated her on the risk of diseases from using tobacco products such as {Tobacco Cessation Diseases:59232::\"cancer\",\"COPD\",\"heart disease\"}.     I advised her to quit and she is {Willing/Not Willing to Quit Tobacco Products:61379}.    I spent {Time Spent Tobacco :77432} minutes counseling the patient.                      Electronically signed by JIGAR Campos,  April 10, 2024 17:23 EDT  "

## 2024-04-10 NOTE — PROGRESS NOTES
Procedure     ECG 12 Lead    Date/Time: 4/10/2024 5:23 PM  Performed by: Pati Dean APRN    Authorized by: Pati Dean APRN  Comparison: compared with previous ECG   Similar to previous ECG  Rhythm: atrial fibrillation  Rate: normal  BPM: 92  ST Segments: ST segments normal    Clinical impression: abnormal EKG  Comments: Normal QTc at 386 bpm

## 2024-04-10 NOTE — TELEPHONE ENCOUNTER
Patient with atrial fibrillation diagnosed in November 2023.  She was asymptomatic so we left her on metoprolol and added Xarelto.    She underwent surgery for vulvar melanoma.    At recent follow-up in [place], she was symptomatic with weakness and SOB.  Noted to be AF with RVR at 128.  Metoprolol changed to sotalol and titrated to 80 twice daily.  Digoxin added for rate control.    EKG today showed AF at 92 bpm, normal QT.    If she interested in undergoing cardioversion?  This can get her back in rhythm if she is symptomatic.    If she is feeling better with the rate down, we can continue medications as they are now.

## 2024-04-11 ENCOUNTER — TELEPHONE (OUTPATIENT)
Dept: CARDIOLOGY | Facility: CLINIC | Age: 84
End: 2024-04-11
Payer: MEDICARE

## 2024-04-11 NOTE — TELEPHONE ENCOUNTER
Caller: EDE CLARK    Relationship: Emergency Contact    Best call back number: [phone]     What orders are you requesting (i.e. lab or imaging): CARDIOVERSION AS DISCUSSED WITH THE NURSE    In what timeframe would the patient need to come in: ASAP

## 2024-04-11 NOTE — TELEPHONE ENCOUNTER
Spoke with Alex concerning cardioversion. Patient is not having any symptoms and aware if has symptoms to call office back and can proceed with cardioversion.

## 2024-04-11 NOTE — TELEPHONE ENCOUNTER
Patient made aware of EKG results, spoke with patient concerning symptoms. Patient reports she is not having any symptoms, patient wishes to continue current medications for now and will call office back if she becomes symptomatic.

## 2024-04-16 ENCOUNTER — TELEPHONE (OUTPATIENT)
Dept: CARDIOLOGY | Facility: CLINIC | Age: 84
End: 2024-04-16
Payer: MEDICARE

## 2024-04-16 NOTE — TELEPHONE ENCOUNTER
REQUEST FOR CARDIAC CLEARANCE    Caller name: [facility].    Phone Number: [phone]    Surgeon's name: DARIO MCCORD MD    Type of planned surgery: TOOTH EXTRACTIONS AND DENTAL IMPLANTS    Date of planned surgery: WAITING ON CLEARANCE    Type of anesthesia: NONE    Have you been experiencing chest pain or shortness of breath?     Is your doctor requesting for you to stop any of your medications prior to your surgery? XARELTO AND BLOOD THINNERS. AND CAN CARBOCAINE BE USED    Where should we fax the clearance to? [phone]

## 2024-04-18 ENCOUNTER — PATIENT MESSAGE (OUTPATIENT)
Dept: CARDIOLOGY | Facility: CLINIC | Age: 84
End: 2024-04-18
Payer: MEDICARE

## 2024-04-29 ENCOUNTER — TELEPHONE (OUTPATIENT)
Dept: CARDIOLOGY | Facility: CLINIC | Age: 84
End: 2024-04-29
Payer: MEDICARE

## 2024-04-29 NOTE — TELEPHONE ENCOUNTER
Received fax from Dr. Dao for cardiac clearance for patient to have an extraction and implants of 2 front teeth. Patient is on Xarelto and they are requesting to hold. According to our records, I do not see where patient has had any stenting. Patient has a history of afib.     They are also asking if it is ok to give patient amoxicillin 875 mg and norco 7.5-325 mg post-op?          Fax [phone]

## 2024-05-14 ENCOUNTER — OFFICE VISIT (OUTPATIENT)
Dept: CARDIOLOGY | Facility: CLINIC | Age: 84
End: 2024-05-14
Payer: MEDICARE

## 2024-05-14 VITALS
WEIGHT: 146.4 LBS | DIASTOLIC BLOOD PRESSURE: 62 MMHG | SYSTOLIC BLOOD PRESSURE: 100 MMHG | BODY MASS INDEX: 28.74 KG/M2 | HEART RATE: 85 BPM | HEIGHT: 60 IN

## 2024-05-14 DIAGNOSIS — I25.10 CORONARY ARTERY DISEASE INVOLVING NATIVE CORONARY ARTERY OF NATIVE HEART WITHOUT ANGINA PECTORIS: ICD-10-CM

## 2024-05-14 DIAGNOSIS — I10 ESSENTIAL HYPERTENSION: ICD-10-CM

## 2024-05-14 DIAGNOSIS — I48.19 PERSISTENT ATRIAL FIBRILLATION: ICD-10-CM

## 2024-05-14 DIAGNOSIS — E78.00 HYPERCHOLESTEREMIA: ICD-10-CM

## 2024-05-14 DIAGNOSIS — I48.91 ATRIAL FIBRILLATION, CONTROLLED: Primary | ICD-10-CM

## 2024-05-14 NOTE — PROGRESS NOTES
Chief Complaint   Patient presents with    Follow-up     Cardiac management    Lab     Last labs a week ago. She had been confused went to [facility]. She is having PET scan tomorrow. She had raised discolored area in groin.    weakness     Having increased weakness and fatigue. Has decreased appetite.    Dizziness     Having some dizziness, unsteady balance.    Med Refill     Does not need refills at this time.     Carlos Tellez is a 83 y.o. female with HTN, mild dyslipidemia, and significant arthritis referred for cardiac evaluation in August 2021. She denied CP but admitted to severe fatigue and SOB on exertion. Work up showed 65-70% of D1 and D2 which were 1mm vessels, too small for intervention. Normal EF. Medical management advised.      She returned November 2023 requesting cardiac clearance for surgery for malignant melanoma of vulva at .  She was noted to be in atrial fibrillation with controlled rate. Asymptomatic, therefore only Xarelto started.  Echo showed normal EF.  TSH and mag normal. After her surgery, she was noted to be in RVR.  Atenolol changed to sotalol. Then digoxin added and rate improved.     She returns today for follow up. She denies CP, dyspnea, palpitations. She is extremely weak and fatigued. Recently had confusion, was seen in [facility]. CT head ruled out CVA. Labs showed UTI which was treated. She is planning to follow up with  for repeat PET. She noted enlarging mass in right groin. Previous scan showed fluid filled seroma without increased uptake. She is worried about this. She is in a wheelchair today.          Cardiac History:    Past Surgical History:   Procedure Laterality Date    CARDIAC CATHETERIZATION  11/29/2021    65-70% Small D! & D2    CARDIOVASCULAR STRESS TEST  11/10/2021    Lexiscan- EF 61%. R/O Anterior Ischemia    ECHO - CONVERTED  11/03/2021    EF 65%. Mild MR & AI. RVSP- 36 mmHg    ECHO - CONVERTED  11/03/2023    EF 60%. LA- 4.1 .Mild MR & AI. RVSP- 40 mmHg      Current Outpatient Medications   Medication Sig Dispense Refill    amLODIPine (NORVASC) 5 MG tablet Take 1 tablet by mouth Daily.      digoxin (LANOXIN) 125 MCG tablet Take 1 tablet by mouth Daily. 30 tablet 11    DULoxetine (CYMBALTA) 60 MG capsule Take 1 capsule by mouth Daily.      gabapentin (NEURONTIN) 300 MG capsule Take 1 capsule by mouth 3 (Three) Times a Day.      lansoprazole (PREVACID) 30 MG capsule Take 1 capsule by mouth Daily.      lisinopril-hydrochlorothiazide (Zestoretic) 20-12.5 MG per tablet Take 1 tablet by mouth Daily. 90 tablet 2    Lutein 20 MG tablet Take  by mouth Daily.      multivitamin with minerals tablet tablet Take 1 tablet by mouth Daily.      rivaroxaban (XARELTO) 20 MG tablet Take 1 tablet by mouth Daily. 90 tablet 3    Sotalol HCl AF 80 MG tablet TAKE 1 TABLET TWICE A DAY 60 tablet 11    traMADol (ULTRAM) 50 MG tablet Take 1 tablet by mouth 4 (Four) Times a Day.       No current facility-administered medications for this visit.     Codeine, Novocain [procaine], and Sulfa antibiotics    Past Medical History:   Diagnosis Date    Arthritis     Asthma     Cancer     malignant melanoma    Chronic bronchitis     Depression     Diverticulitis     Fibromyalgia     GERD (gastroesophageal reflux disease)     History of back surgery     History of bilateral knee replacement     History of cholecystectomy     History of colonoscopy with polypectomy     History of hysterectomy     History of tonsillectomy     Hyperlipidemia     Hypertension     Migraines      Social History     Socioeconomic History    Marital status:    Tobacco Use    Smoking status: Former     Current packs/day: 0.00     Average packs/day: 1 pack/day for 15.0 years (15.0 ttl pk-yrs)     Types: Cigarettes     Start date:      Quit date:      Years since quittin.4     Passive exposure: Past    Smokeless tobacco: Never   Vaping Use    Vaping status: Never Used   Substance and Sexual Activity    Alcohol  "use: Never    Drug use: Never    Sexual activity: Defer     Family History   Problem Relation Age of Onset    Hypertension Mother     Lymphoma Mother     Heart attack Father     Heart disease Father     Alzheimer's disease Sister     Mitral valve prolapse Sister     Other Sister         renal artery stenosis    Heart disease Maternal Grandfather     No Known Problems Son     No Known Problems Daughter      Review of Systems   Constitutional: Positive for malaise/fatigue and weight loss (-1). Negative for decreased appetite.   HENT: Negative.     Eyes:  Negative for blurred vision.   Cardiovascular:  Negative for chest pain, dyspnea on exertion, leg swelling, palpitations and syncope.   Respiratory:  Negative for shortness of breath and sleep disturbances due to breathing.    Endocrine: Negative.    Hematologic/Lymphatic: Negative for bleeding problem. Does not bruise/bleed easily.   Skin: Negative.    Musculoskeletal:  Negative for falls and myalgias.   Gastrointestinal:  Negative for abdominal pain, heartburn and melena.   Genitourinary:  Negative for hematuria.   Neurological:  Negative for dizziness and light-headedness.   Psychiatric/Behavioral:  Negative for altered mental status.    Allergic/Immunologic: Negative.       Objective     /62 (BP Location: Right arm)   Pulse 85   Ht 152.4 cm (60\")   Wt 66.4 kg (146 lb 6.4 oz)   BMI 28.59 kg/m²     Vitals and nursing note reviewed.   Constitutional:       General: Not in acute distress.     Appearance: Well-developed. Not diaphoretic.   Eyes:      Pupils: Pupils are equal, round, and reactive to light.   HENT:      Head: Normocephalic.   Pulmonary:      Effort: Pulmonary effort is normal. No respiratory distress.      Breath sounds: Normal breath sounds.   Cardiovascular:      Normal rate. Irregularly irregular rhythm.   Pulses:     Intact distal pulses.   Edema:     Peripheral edema absent.   Abdominal:      General: Bowel sounds are normal.      " Palpations: Abdomen is soft.   Musculoskeletal: Normal range of motion.      Cervical back: Normal range of motion. Skin:     General: Skin is warm and dry.   Neurological:      Mental Status: Alert and oriented to person, place, and time.          ECG 12 Lead    Date/Time: 5/17/2024 9:45 AM  Performed by: Pati Dean APRN    Authorized by: Pati Dean APRN  Comparison: compared with previous ECG   Similar to previous ECG  Rhythm: atrial fibrillation  Rate: normal  BPM: 85  ST Segments: ST segments normal    Clinical impression: abnormal EKG  Comments: AF, persistent, rate controlled, asymptomatic               Problem List Items Addressed This Visit          Cardiac and Vasculature    Hypercholesteremia    Essential hypertension    Persistent atrial fibrillation    Coronary artery disease involving native coronary artery of native heart without angina pectoris     Other Visit Diagnoses       Atrial fibrillation, controlled    -  Primary    Relevant Orders    ECG 12 Lead          Atrial fibrillation/SOB  -persistent, rate controlled, asymptomatic   -EKG showed AF at 85 bpm, normal QT  -Continue Xarelto 20 mg, sotalol and digoxin      HTN  -BP well controlled  -Continue amlodipine, lisinopril/hctz      Dyslipidemia  -lipids followed by Dr. Ariza   -labs from [facility] reviewed     Confusion  -improving  -labs showed significant UTI    CAD  -stenosis of small coronary artery  -continue medical management     Vulvar Ca  -following closely at [facility]  -PET scheduled tomorrow    Cardiac status appears stable. FU 6 months, sooner if needed.     BMI is >= 25 and <30. (Overweight) The following options were offered after discussion;: nutrition counseling/recommendations               Electronically signed by JIGAR Campos,  May 17, 2024 10:51 EDT

## 2024-05-17 PROBLEM — I25.810 CORONARY ARTERY DISEASE INVOLVING CORONARY BYPASS GRAFT OF NATIVE HEART WITHOUT ANGINA PECTORIS: Status: RESOLVED | Noted: 2022-01-12 | Resolved: 2024-05-17

## 2024-05-17 PROBLEM — I48.19 PERSISTENT ATRIAL FIBRILLATION: Status: ACTIVE | Noted: 2024-05-17

## 2024-05-17 PROBLEM — I25.10 CORONARY ARTERY DISEASE INVOLVING NATIVE CORONARY ARTERY OF NATIVE HEART WITHOUT ANGINA PECTORIS: Status: ACTIVE | Noted: 2024-05-17

## 2024-06-03 DIAGNOSIS — I48.91 ATRIAL FIBRILLATION, CONTROLLED: ICD-10-CM

## 2024-06-24 RX ORDER — LISINOPRIL AND HYDROCHLOROTHIAZIDE 20; 12.5 MG/1; MG/1
1 TABLET ORAL DAILY
Qty: 90 TABLET | Refills: 2 | Status: SHIPPED | OUTPATIENT
Start: 2024-06-24

## 2024-06-24 RX ORDER — DIGOXIN 125 MCG
125 TABLET ORAL DAILY
Qty: 90 TABLET | Refills: 2 | Status: SHIPPED | OUTPATIENT
Start: 2024-06-24

## 2024-06-24 NOTE — TELEPHONE ENCOUNTER
----- Message from Latonya HIGHTOWER sent at 6/24/2024  4:20 PM EDT -----  Regarding: FW: Changing medications over to [facility]  Contact: [phone]    ----- Message -----  From: Stefany Tellez  Sent: 6/22/2024  10:34 AM EDT  To: [facility] [place]  Subject: Changing medications over to [facility]     I was contacting you for Stefany Laye 1940 to change her medications listed below to [facility].  Sotalol HCl AF 80 MG tablet  lisinopril-hydrochlorothiazide 20-12.5 MG per tablet  digoxin 125 MCG tablet  You have already sent the script for Xarelto to them   Thanks for your help.  Alex Tellez

## 2024-11-26 ENCOUNTER — OFFICE VISIT (OUTPATIENT)
Dept: CARDIOLOGY | Facility: CLINIC | Age: 84
End: 2024-11-26
Payer: MEDICARE

## 2024-11-26 VITALS
DIASTOLIC BLOOD PRESSURE: 62 MMHG | HEIGHT: 60 IN | BODY MASS INDEX: 25.09 KG/M2 | SYSTOLIC BLOOD PRESSURE: 112 MMHG | WEIGHT: 127.8 LBS | HEART RATE: 93 BPM

## 2024-11-26 DIAGNOSIS — E78.00 HYPERCHOLESTEREMIA: ICD-10-CM

## 2024-11-26 DIAGNOSIS — I48.19 PERSISTENT ATRIAL FIBRILLATION: ICD-10-CM

## 2024-11-26 DIAGNOSIS — I10 ESSENTIAL HYPERTENSION: ICD-10-CM

## 2024-11-26 DIAGNOSIS — I48.91 ATRIAL FIBRILLATION, CONTROLLED: Primary | ICD-10-CM

## 2024-11-26 DIAGNOSIS — R53.82 CHRONIC FATIGUE: ICD-10-CM

## 2024-11-26 DIAGNOSIS — I25.10 CORONARY ARTERY DISEASE INVOLVING NATIVE CORONARY ARTERY OF NATIVE HEART WITHOUT ANGINA PECTORIS: ICD-10-CM

## 2024-11-26 NOTE — PROGRESS NOTES
Chief Complaint   Patient presents with    Follow-up     Cardiac management    Lab     Last labs in chart.    Fall     Had fall a few months ago went to ER and was in Afib.    Med Refill     Needs refills on cardiac medication-90 day    Weight loss     Has not been eating well, decreased appetite.     Carlos Tellez is a 84 y.o. female with HTN, mild dyslipidemia, and significant arthritis referred for cardiac evaluation in August 2021. She denied CP but admitted to severe fatigue and SOB on exertion. Work up showed 65-70% of D1 and D2 which were 1mm vessels, too small for intervention. Normal EF. Medical management advised.      She returned November 2023 requesting cardiac clearance for surgery for malignant melanoma of vulva at .  She was noted to be in atrial fibrillation with controlled rate. Asymptomatic, therefore only Xarelto started.  Echo showed normal EF.  TSH and mag normal. After her surgery, she was noted to be in RVR.  Atenolol changed to sotalol. Then digoxin added and rate improved. She was admitted with UTI, confusion May 2024. CT head normal.      She returns today for follow up. She denies cardiac symptoms. No CP, dyspnea, palpitations. She is unaware of the atrial fibrillation. PET scan at  remains negative. She continues to be weak. Loss of appetite. She has lost 19 lbs in 6 months. Labs followed by  and Dr. Ariza. On 11/19/24: normal LDH, BUN/Cr 21/0.5, alk phos 172, H/H 14.5/44.6, plts 160. Digoxin level, TSH, mag not available. Lipids not available.          Cardiac History:    Past Surgical History:   Procedure Laterality Date    CARDIAC CATHETERIZATION  11/29/2021    65-70% Small D! & D2    CARDIOVASCULAR STRESS TEST  11/10/2021    Lexiscan- EF 61%. R/O Anterior Ischemia    ECHO - CONVERTED  11/03/2021    EF 65%. Mild MR & AI. RVSP- 36 mmHg    ECHO - CONVERTED  11/03/2023    EF 60%. LA- 4.1 .Mild MR & AI. RVSP- 40 mmHg     Current Outpatient Medications   Medication Sig  Dispense Refill    amLODIPine (NORVASC) 5 MG tablet Take 1 tablet by mouth Daily.      digoxin (LANOXIN) 125 MCG tablet Take 1 tablet by mouth Daily. 90 tablet 3    DULoxetine (CYMBALTA) 60 MG capsule Take 1 capsule by mouth Daily.      fluticasone (VERAMYST) 27.5 MCG/SPRAY nasal spray Administer 2 sprays into the nostril(s) as directed by provider Daily.      gabapentin (NEURONTIN) 300 MG capsule Take 1 capsule by mouth 3 (Three) Times a Day.      lansoprazole (PREVACID) 30 MG capsule Take 1 capsule by mouth Daily.      lisinopril-hydrochlorothiazide (Zestoretic) 20-12.5 MG per tablet Take 1 tablet by mouth Daily. 90 tablet 3    Lutein 20 MG tablet Take  by mouth Daily.      multivitamin with minerals tablet tablet Take 1 tablet by mouth Daily.      rivaroxaban (XARELTO) 20 MG tablet Take 1 tablet by mouth Daily. 90 tablet 3    Sotalol HCl AF 80 MG tablet TAKE 1 TABLET TWICE A  tablet 3    traMADol (ULTRAM) 50 MG tablet Take 1 tablet by mouth 4 (Four) Times a Day.       No current facility-administered medications for this visit.     Codeine, Novocain [procaine], and Sulfa antibiotics    Past Medical History:   Diagnosis Date    Arthritis     Asthma     Atrial fibrillation 11-1-2023    This was detected Wed at the pre op visit at [facility] while doing EKG to get clearance for Urgent Cancer surgery 11-9-23    Cancer     malignant melanoma    Chronic bronchitis     Depression     Diverticulitis     Fibromyalgia     GERD (gastroesophageal reflux disease)     History of back surgery     History of bilateral knee replacement     History of cholecystectomy     History of colonoscopy with polypectomy     History of hysterectomy     History of tonsillectomy     Hyperlipidemia     Hypertension     Migraines      Social History     Socioeconomic History    Marital status:    Tobacco Use    Smoking status: Former     Current packs/day: 0.00     Average packs/day: 1 pack/day for 15.0 years (15.0 ttl  "pk-yrs)     Types: Cigarettes     Start date:      Quit date:      Years since quittin.9     Passive exposure: Past    Smokeless tobacco: Never   Vaping Use    Vaping status: Never Used   Substance and Sexual Activity    Alcohol use: Not Currently     Comment: Only a social drink after dinner occasionally, but not since    Drug use: Never    Sexual activity: Not Currently     Partners: Male     Birth control/protection: Diaphragm, Surgical     Family History   Problem Relation Age of Onset    Hypertension Mother     Lymphoma Mother     Heart attack Father     Heart disease Father     Alzheimer's disease Sister     Mitral valve prolapse Sister     Heart disease Maternal Grandfather     No Known Problems Son     No Known Problems Daughter      Review of Systems   Constitutional: Positive for malaise/fatigue and weight loss (-19). Negative for decreased appetite.   HENT: Negative.     Eyes:  Negative for blurred vision.   Cardiovascular:  Negative for chest pain, dyspnea on exertion, leg swelling, palpitations and syncope.   Respiratory:  Negative for shortness of breath and sleep disturbances due to breathing.    Endocrine: Negative.    Hematologic/Lymphatic: Negative for bleeding problem. Does not bruise/bleed easily.   Skin: Negative.    Musculoskeletal:  Negative for falls and myalgias.   Gastrointestinal:  Negative for abdominal pain, heartburn and melena.   Genitourinary:  Negative for hematuria.   Neurological:  Positive for weakness. Negative for dizziness and light-headedness.   Psychiatric/Behavioral:  Negative for altered mental status.    Allergic/Immunologic: Negative.       Objective     /62 (BP Location: Right arm, Patient Position: Sitting)   Pulse 93   Ht 152.4 cm (60\")   Wt 58 kg (127 lb 12.8 oz)   BMI 24.96 kg/m²     Vitals and nursing note reviewed.   Constitutional:       General: Not in acute distress.     Appearance: Well-developed. Not diaphoretic.   Eyes:      Pupils: " Pupils are equal, round, and reactive to light.   HENT:      Head: Normocephalic.   Pulmonary:      Effort: Pulmonary effort is normal. No respiratory distress.      Breath sounds: Normal breath sounds.   Cardiovascular:      Normal rate. Irregularly irregular rhythm.      Murmurs: There is a systolic murmur.   Pulses:     Intact distal pulses.   Edema:     Peripheral edema absent.   Abdominal:      General: Bowel sounds are normal.      Palpations: Abdomen is soft.   Musculoskeletal: Normal range of motion.      Cervical back: Normal range of motion. Skin:     General: Skin is warm and dry.   Neurological:      Mental Status: Alert and oriented to person, place, and time.          ECG 12 Lead    Date/Time: 11/26/2024 4:40 PM  Performed by: Pati Dean APRN    Authorized by: Pati Dean APRN  Comparison: compared with previous ECG   Similar to previous ECG  Rhythm: atrial fibrillation  Rate: normal  BPM: 93  ST Segments: ST segments normal    Clinical impression: abnormal EKG  Comments: QTc 395 ms              Problem List Items Addressed This Visit          Cardiac and Vasculature    Hypercholesteremia    Essential hypertension    Relevant Medications    lisinopril-hydrochlorothiazide (Zestoretic) 20-12.5 MG per tablet    Sotalol HCl AF 80 MG tablet    Persistent atrial fibrillation    Relevant Medications    Sotalol HCl AF 80 MG tablet    digoxin (LANOXIN) 125 MCG tablet    Coronary artery disease involving native coronary artery of native heart without angina pectoris    Relevant Medications    Sotalol HCl AF 80 MG tablet    digoxin (LANOXIN) 125 MCG tablet       Symptoms and Signs    Chronic fatigue     Other Visit Diagnoses       Atrial fibrillation, controlled    -  Primary    Relevant Medications    Sotalol HCl AF 80 MG tablet    digoxin (LANOXIN) 125 MCG tablet    rivaroxaban (XARELTO) 20 MG tablet    Other Relevant Orders    ECG 12 Lead           Atrial fibrillation/SOB  -persistent, rate controlled,  asymptomatic   -she is unaware of the arrhythmia  -EKG showed AF at 93 bpm, normal QT  -Continue Xarelto 20 mg, sotalol and digoxin   -consider checking mag, TSH and digoxin level next lab draw.     HTN  -BP well controlled  -Continue amlodipine, lisinopril/hctz      Dyslipidemia  -lipids followed by Dr. Ariza   -CMP, CBC stable ()   -she does not take statin       Weight loss  -19 lb weight loss  -needs further evaluation, following with  oncology  -check digoxin level, she denies nausea or visual disturbance    CAD  -stenosis of small coronary artery  -continue medical management      Vulvar Ca  -following closely at   -PET reported as stable      Cardiac status appears stable. FU 6 months, sooner if needed.     BMI is within normal parameters. No other follow-up for BMI required.               Electronically signed by JIGAR Campos,  December 1, 2024 13:50 EST

## 2024-12-01 RX ORDER — DIGOXIN 125 MCG
125 TABLET ORAL DAILY
Qty: 90 TABLET | Refills: 3 | Status: SHIPPED | OUTPATIENT
Start: 2024-12-01

## 2024-12-01 RX ORDER — LISINOPRIL AND HYDROCHLOROTHIAZIDE 12.5; 2 MG/1; MG/1
1 TABLET ORAL DAILY
Qty: 90 TABLET | Refills: 3 | Status: SHIPPED | OUTPATIENT
Start: 2024-12-01

## 2025-04-17 DIAGNOSIS — I48.91 ATRIAL FIBRILLATION, CONTROLLED: ICD-10-CM

## 2025-04-19 RX ORDER — RIVAROXABAN 20 MG/1
20 TABLET, FILM COATED ORAL DAILY
Qty: 90 TABLET | Refills: 3 | Status: SHIPPED | OUTPATIENT
Start: 2025-04-19

## 2025-05-05 ENCOUNTER — OFFICE VISIT (OUTPATIENT)
Dept: UROLOGY | Facility: CLINIC | Age: 85
End: 2025-05-05
Payer: MEDICARE

## 2025-05-05 VITALS
HEIGHT: 60 IN | BODY MASS INDEX: 25.29 KG/M2 | SYSTOLIC BLOOD PRESSURE: 118 MMHG | WEIGHT: 128.8 LBS | DIASTOLIC BLOOD PRESSURE: 75 MMHG | HEART RATE: 109 BPM

## 2025-05-05 DIAGNOSIS — K59.04 CHRONIC IDIOPATHIC CONSTIPATION: ICD-10-CM

## 2025-05-05 DIAGNOSIS — R35.0 FREQUENCY OF MICTURITION: Primary | ICD-10-CM

## 2025-05-05 DIAGNOSIS — N39.0 URINARY TRACT INFECTION WITHOUT HEMATURIA, SITE UNSPECIFIED: ICD-10-CM

## 2025-05-05 DIAGNOSIS — R33.9 INCOMPLETE EMPTYING OF BLADDER: ICD-10-CM

## 2025-05-05 LAB
BILIRUB BLD-MCNC: NEGATIVE MG/DL
CLARITY, POC: ABNORMAL
COLOR UR: YELLOW
EXPIRATION DATE: ABNORMAL
GLUCOSE UR STRIP-MCNC: NEGATIVE MG/DL
KETONES UR QL: NEGATIVE
LEUKOCYTE EST, POC: ABNORMAL
Lab: ABNORMAL
NITRITE UR-MCNC: POSITIVE MG/ML
PH UR: 6 [PH] (ref 5–8)
PROT UR STRIP-MCNC: ABNORMAL MG/DL
RBC # UR STRIP: ABNORMAL /UL
SP GR UR: 1.01 (ref 1–1.03)
UROBILINOGEN UR QL: NORMAL

## 2025-05-05 PROCEDURE — 87147 CULTURE TYPE IMMUNOLOGIC: CPT

## 2025-05-05 PROCEDURE — 87086 URINE CULTURE/COLONY COUNT: CPT

## 2025-05-05 PROCEDURE — 81003 URINALYSIS AUTO W/O SCOPE: CPT

## 2025-05-05 PROCEDURE — 96372 THER/PROPH/DIAG INJ SC/IM: CPT

## 2025-05-05 PROCEDURE — 99204 OFFICE O/P NEW MOD 45 MIN: CPT

## 2025-05-05 PROCEDURE — 3078F DIAST BP <80 MM HG: CPT

## 2025-05-05 PROCEDURE — 3074F SYST BP LT 130 MM HG: CPT

## 2025-05-05 PROCEDURE — 1159F MED LIST DOCD IN RCRD: CPT

## 2025-05-05 PROCEDURE — 1160F RVW MEDS BY RX/DR IN RCRD: CPT

## 2025-05-05 RX ORDER — DOCUSATE SODIUM 250 MG
250 CAPSULE ORAL DAILY
Qty: 30 CAPSULE | Refills: 1 | Status: SHIPPED | OUTPATIENT
Start: 2025-05-05

## 2025-05-05 RX ORDER — IPRATROPIUM BROMIDE 42 UG/1
SPRAY, METERED NASAL
COMMUNITY
Start: 2025-01-14

## 2025-05-05 RX ORDER — NITROFURANTOIN 25; 75 MG/1; MG/1
100 CAPSULE ORAL EVERY 12 HOURS
Qty: 14 CAPSULE | Refills: 0 | Status: SHIPPED | OUTPATIENT
Start: 2025-05-05

## 2025-05-05 RX ORDER — TAMSULOSIN HYDROCHLORIDE 0.4 MG/1
1 CAPSULE ORAL DAILY
Qty: 30 CAPSULE | Refills: 1 | Status: SHIPPED | OUTPATIENT
Start: 2025-05-05 | End: 2025-05-05

## 2025-05-05 RX ORDER — DICLOFENAC SODIUM 75 MG/1
75 TABLET, DELAYED RELEASE ORAL
COMMUNITY

## 2025-05-05 RX ORDER — GENTAMICIN 40 MG/ML
80 INJECTION, SOLUTION INTRAMUSCULAR; INTRAVENOUS ONCE
Status: COMPLETED | OUTPATIENT
Start: 2025-05-05 | End: 2025-05-05

## 2025-05-05 RX ORDER — TAMSULOSIN HYDROCHLORIDE 0.4 MG/1
1 CAPSULE ORAL DAILY
Qty: 90 CAPSULE | Refills: 3 | Status: SHIPPED | OUTPATIENT
Start: 2025-05-05

## 2025-05-05 RX ADMIN — GENTAMICIN 80 MG: 40 INJECTION, SOLUTION INTRAMUSCULAR; INTRAVENOUS at 09:43

## 2025-05-05 NOTE — PROGRESS NOTES
"Chief Complaint:    Chief Complaint   Patient presents with    Urinary Urgency       Vital Signs:   /75   Pulse 109   Ht 152.4 cm (60\")   Wt 58.4 kg (128 lb 12.8 oz)   BMI 25.15 kg/m²   Body mass index is 25.15 kg/m².      HPI:  Stefany Tellez is a 84 y.o. female who presents today for initial evaluation     History of Present Illness  Ms. Tellez presents to the clinic for evaluation of weak urinary stream and urgency symptoms.  She has been referred to us by Shanel De La Rosa.  She reports that she was initially seen in gynecologist due to concerns of urinary incontinence and possible bladder prolapse and had a urogenital exam at that time.  She was found to have a malignant melanoma of the left vulva and underwent urgent surgical removal in [facility].  She has since had routine PET scans which showed no concerns of any metastatic disease.  She reports that since surgery she has had some weak stream and difficulty with urination.  She states she has to press on the right inner side of her thigh to help urinate at times.  She reports she was reexamined 2 months ago and advised everything appeared normal.  She does have some intermittent dysuria, frequency, and urgency. She denies any gross hematuria.  She does report chronic constipation and takes over-the-counter medications such as MiraLAX and other laxatives with some improvement.    Urine analysis is concerning with infection shows 3+ leukocytes, positive nitrites, and 1+ blood.  Postvoid residual in office today was 67 mL.       Past Medical History:  Past Medical History:   Diagnosis Date    Arthritis     Asthma     Atrial fibrillation 11-1-2023    This was detected Wed at the pre op visit at [facility] while doing EKG to get clearance for Urgent Cancer surgery 11-9-23    Cancer     malignant melanoma    Chronic bronchitis     Depression     Diverticulitis     Fibromyalgia     GERD (gastroesophageal reflux disease)     History of back surgery  "    History of bilateral knee replacement     History of cholecystectomy     History of colonoscopy with polypectomy     History of hysterectomy     History of tonsillectomy     Hyperlipidemia     Hypertension     Migraines     Urinary incontinence     Urinary tract infection        Current Meds:  Current Outpatient Medications   Medication Sig Dispense Refill    amLODIPine (NORVASC) 5 MG tablet Take 1 tablet by mouth Daily.      diclofenac (VOLTAREN) 75 MG EC tablet 1 tablet.      digoxin (LANOXIN) 125 MCG tablet Take 1 tablet by mouth Daily. 90 tablet 3    DULoxetine (CYMBALTA) 60 MG capsule Take 1 capsule by mouth Daily.      fluticasone (VERAMYST) 27.5 MCG/SPRAY nasal spray Administer 2 sprays into the nostril(s) as directed by provider Daily.      gabapentin (NEURONTIN) 300 MG capsule Take 1 capsule by mouth 3 (Three) Times a Day.      ipratropium (ATROVENT) 0.06 % nasal spray       lansoprazole (PREVACID) 30 MG capsule Take 1 capsule by mouth Daily.      Lutein 20 MG tablet Take  by mouth Daily.      multivitamin with minerals tablet tablet Take 1 tablet by mouth Daily.      Sotalol HCl AF 80 MG tablet TAKE 1 TABLET TWICE A  tablet 3    traMADol (ULTRAM) 50 MG tablet Take 1 tablet by mouth 4 (Four) Times a Day.      Xarelto 20 MG tablet TAKE 1 TABLET DAILY 90 tablet 3    docusate sodium (COLACE) 250 MG capsule Take 1 capsule by mouth Daily. 30 capsule 1    nitrofurantoin, macrocrystal-monohydrate, (Macrobid) 100 MG capsule Take 1 capsule by mouth Every 12 (Twelve) Hours. 14 capsule 0    tamsulosin (FLOMAX) 0.4 MG capsule 24 hr capsule TAKE 1 CAPSULE BY MOUTH DAILY 90 capsule 3     No current facility-administered medications for this visit.        Allergies:   Allergies   Allergen Reactions    Codeine Nausea And Vomiting    Novocain [Procaine] Nausea And Vomiting    Sulfa Antibiotics Nausea And Vomiting    Chlorhexidine Gluconate Rash        Past Surgical History:  Past Surgical History:   Procedure  Laterality Date    APPENDECTOMY      CARDIAC CATHETERIZATION  2021    65-70% Small D! & D2    CARDIOVASCULAR STRESS TEST  11/10/2021    Lexiscan- EF 61%. R/O Anterior Ischemia     SECTION      ECHO - CONVERTED  2021    EF 65%. Mild MR & AI. RVSP- 36 mmHg    ECHO - CONVERTED  2023    EF 60%. LA- 4.1 .Mild MR & AI. RVSP- 40 mmHg    HYSTERECTOMY         Social History:  Social History     Socioeconomic History    Marital status:    Tobacco Use    Smoking status: Former     Current packs/day: 0.00     Average packs/day: 1 pack/day for 15.0 years (15.0 ttl pk-yrs)     Types: Cigarettes     Start date: 1970     Quit date:      Years since quittin.3     Passive exposure: Past    Smokeless tobacco: Never   Vaping Use    Vaping status: Never Used   Substance and Sexual Activity    Alcohol use: Not Currently     Comment: Only a social drink after dinner occasionally, but not since    Drug use: Never    Sexual activity: Not Currently     Partners: Male     Birth control/protection: Diaphragm, Surgical       Family History:  Family History   Problem Relation Age of Onset    Hypertension Mother     Lymphoma Mother     Cancer Mother     Heart attack Father     Heart disease Father     Alzheimer's disease Sister     Mitral valve prolapse Sister     Heart disease Maternal Grandfather     No Known Problems Son     No Known Problems Daughter        Review of Systems:  Review of Systems   Constitutional:  Negative for fatigue, fever and unexpected weight change.   HENT:  Negative for congestion.    Respiratory:  Negative for chest tightness and shortness of breath.    Cardiovascular:  Negative for chest pain.   Gastrointestinal:  Negative for abdominal pain, constipation, diarrhea, nausea and vomiting.   Genitourinary:  Positive for difficulty urinating, dysuria, frequency and urgency.   Skin:  Negative for rash.   Psychiatric/Behavioral:  Negative for confusion and suicidal ideas.         Physical Exam:  Physical Exam  Constitutional:       General: She is not in acute distress.     Appearance: Normal appearance.   HENT:      Head: Normocephalic and atraumatic.      Nose: Nose normal.      Mouth/Throat:      Mouth: Mucous membranes are moist.   Eyes:      Conjunctiva/sclera: Conjunctivae normal.   Cardiovascular:      Rate and Rhythm: Normal rate.      Pulses: Normal pulses.   Pulmonary:      Effort: Pulmonary effort is normal.   Abdominal:      Palpations: Abdomen is soft.   Genitourinary:     Comments: Denied urogenital exam at this time  Musculoskeletal:         General: Normal range of motion.      Cervical back: Normal range of motion.   Skin:     General: Skin is warm.   Neurological:      General: No focal deficit present.      Mental Status: She is alert and oriented to person, place, and time.   Psychiatric:         Mood and Affect: Mood normal.         Behavior: Behavior normal.         Thought Content: Thought content normal.         Judgment: Judgment normal.           Recent Image (CT and/or KUB):   CT Abdomen and Pelvis: No results found for this or any previous visit.     CT Stone Protocol: No results found for this or any previous visit.     KUB: No results found for this or any previous visit.       Labs:  Brief Urine Lab Results  (Last result in the past 365 days)        Color   Clarity   Blood   Leuk Est   Nitrite   Protein   CREAT   Urine HCG        05/05/25 0904 Yellow   Cloudy   1+   Large (3+)   Positive   1+                 Office Visit on 05/05/2025   Component Date Value Ref Range Status    Color 05/05/2025 Yellow  Yellow, Straw, Dark Yellow, Bri Final    Clarity, UA 05/05/2025 Cloudy (A)  Clear Final    Specific Gravity  05/05/2025 1.015  1.005 - 1.030 Final    pH, Urine 05/05/2025 6.0  5.0 - 8.0 Final    Leukocytes 05/05/2025 Large (3+) (A)  Negative Final    Nitrite, UA 05/05/2025 Positive (A)  Negative Final    Protein, POC 05/05/2025 1+ (A)  Negative mg/dL Final     Glucose, UA 05/05/2025 Negative  Negative mg/dL Final    Ketones, UA 05/05/2025 Negative  Negative Final    Urobilinogen, UA 05/05/2025 Normal  Normal, 0.2 E.U./dL Final    Bilirubin 05/05/2025 Negative  Negative Final    Blood, UA 05/05/2025 1+ (A)  Negative Final    Lot Number 05/05/2025 [id number]   Final    Expiration Date 05/05/2025 5/1/2026   Final        Procedure: None  Procedures     I have reviewed and agree with the above PMH, PSH, FMH, social history, medications, allergies, and labs.     Assessment/Plan:   Problem List Items Addressed This Visit       Urinary tract infection without hematuria    Relevant Medications    nitrofurantoin, macrocrystal-monohydrate, (Macrobid) 100 MG capsule    gentamicin (GARAMYCIN) injection 80 mg (Completed)    Chronic idiopathic constipation    Relevant Medications    docusate sodium (COLACE) 250 MG capsule    Incomplete emptying of bladder    Relevant Orders    Bladder Scan (Completed)     Other Visit Diagnoses         Frequency of micturition    -  Primary    Relevant Medications    gentamicin (GARAMYCIN) injection 80 mg (Completed)    Other Relevant Orders    POC Urinalysis Dipstick, Automated (Completed)    Urine Culture - Urine, Urine, Random Void            Health Maintenance:   Health Maintenance Due   Topic Date Due    LIPID PANEL  Never done    DXA SCAN  Never done    TDAP/TD VACCINES (1 - Tdap) Never done    Pneumococcal Vaccine 50+ (1 of 1 - PCV) Never done    ZOSTER VACCINE (1 of 2) Never done    RSV Vaccine - Adults (1 - 1-dose 75+ series) Never done    ANNUAL WELLNESS VISIT  Never done    COVID-19 Vaccine (7 - 2024-25 season) 04/25/2025        Smoking Counseling: Former smoker.  Never used smokeless tobacco.  Counseling given.    Urine Incontinence: Patient reports that she is having very mild urinary incontinence.    Patient was given instructions and counseling regarding her condition or for health maintenance advice. Please see specific information  pulled into the AVS if appropriate.    Patient Education:   Urinary tract infection -I discussed with the patient ways to help prevent urinary tract infection.  Advised patient to continue to increase water to 2 to 3 L/day.  Advised patient to take an over-the-counter probiotic to help with growth and control of normal urogenital marylou.  Discussed with the patient the use of a nightly antibiotic to help prevent urinary tract infections.  Given patient's urine analysis will start her on Macrobid 100 mg once every 12 hours for 7 days.  Also give the patient injection of gentamicin 8 mg in office today.  Did discuss the use of nightly antibiotics as prescribed above however will hold off at this time.  Call her with urine culture results once available.  Chronic idiopathic constipation -did advise patient increase water intake 2 to 3 L/day.  Recommend to continue with MiraLAX and other laxatives as needed over-the-counter.  Will started on docusate 250 mg once daily.  She verbalized understanding.  Incomplete emptying/weak stream -discussed with the patient the pathophysiology of this condition in detail.  Discussed causes which can include stones,'s infections, was bladder prolapse, urethral trauma, surgery, and other urological abnormalities.  Given infection we will hold off on cystoscopy at this time.  Will start on tamsulosin 0.4 mg once daily.  Did advise her to discontinue if she manage experience increased lightheadedness, dizziness, blurry vision, chest pain, shortness of breath, or syncope.  Did discuss appropriate workup including physical exam and cystoscopy.  Patient wishes to hold off on physical exam today.  Will reevaluate at next office visit.    Visit Diagnoses:    ICD-10-CM ICD-9-CM   1. Frequency of micturition  R35.0 788.41   2. Urinary tract infection without hematuria, site unspecified  N39.0 599.0   3. Chronic idiopathic constipation  K59.04 564.00   4. Incomplete emptying of bladder  R33.9  788.21     A total of 45 minutes were spent coordinating this patient’s care in clinic today; 30 minutes of which were face-to-face with the patient, reviewing medical history and counseling on the current treatment and followup plan.  All questions were answered to patient's satisfaction.    Meds Ordered During Visit:  New Medications Ordered This Visit   Medications    nitrofurantoin, macrocrystal-monohydrate, (Macrobid) 100 MG capsule     Sig: Take 1 capsule by mouth Every 12 (Twelve) Hours.     Dispense:  14 capsule     Refill:  0    docusate sodium (COLACE) 250 MG capsule     Sig: Take 1 capsule by mouth Daily.     Dispense:  30 capsule     Refill:  1    gentamicin (GARAMYCIN) injection 80 mg       Follow Up Appointment: 6 to 8 weeks  No follow-ups on file.      This document has been electronically signed by Thai Mascorro PA-C   May 5, 2025 12:30 EDT    Part of this note may be an electronic transcription/translation of spoken language to printed text using the Dragon Dictation System.

## 2025-05-07 ENCOUNTER — RESULTS FOLLOW-UP (OUTPATIENT)
Dept: UROLOGY | Facility: CLINIC | Age: 85
End: 2025-05-07
Payer: MEDICARE

## 2025-05-07 LAB — BACTERIA SPEC AEROBE CULT: ABNORMAL

## 2025-05-07 NOTE — TELEPHONE ENCOUNTER
Called patient advised that urine showed Staphylococcus colagase and this is most likely due to skin contamination.  Recommend to finish Macrobid and keep follow-up appointment.  She verbalized understanding.

## 2025-05-08 ENCOUNTER — PATIENT ROUNDING (BHMG ONLY) (OUTPATIENT)
Dept: UROLOGY | Facility: CLINIC | Age: 85
End: 2025-05-08
Payer: MEDICARE

## 2025-06-11 ENCOUNTER — OFFICE VISIT (OUTPATIENT)
Dept: CARDIOLOGY | Facility: CLINIC | Age: 85
End: 2025-06-11
Payer: MEDICARE

## 2025-06-11 VITALS
HEART RATE: 84 BPM | DIASTOLIC BLOOD PRESSURE: 60 MMHG | WEIGHT: 125.2 LBS | SYSTOLIC BLOOD PRESSURE: 116 MMHG | BODY MASS INDEX: 24.58 KG/M2 | HEIGHT: 60 IN

## 2025-06-11 DIAGNOSIS — I10 ESSENTIAL HYPERTENSION: ICD-10-CM

## 2025-06-11 DIAGNOSIS — I25.10 CORONARY ARTERY DISEASE INVOLVING NATIVE CORONARY ARTERY OF NATIVE HEART WITHOUT ANGINA PECTORIS: ICD-10-CM

## 2025-06-11 DIAGNOSIS — I48.19 PERSISTENT ATRIAL FIBRILLATION: ICD-10-CM

## 2025-06-11 DIAGNOSIS — I48.91 ATRIAL FIBRILLATION, CONTROLLED: ICD-10-CM

## 2025-06-11 DIAGNOSIS — E78.00 HYPERCHOLESTEREMIA: Primary | ICD-10-CM

## 2025-06-11 RX ORDER — DIGOXIN 125 MCG
125 TABLET ORAL DAILY
Qty: 90 TABLET | Refills: 3 | Status: SHIPPED | OUTPATIENT
Start: 2025-06-11

## 2025-06-11 RX ORDER — LISINOPRIL AND HYDROCHLOROTHIAZIDE 12.5; 2 MG/1; MG/1
1 TABLET ORAL DAILY
COMMUNITY

## 2025-06-11 NOTE — PROGRESS NOTES
Chief Complaint   Patient presents with    Follow-up     Cardiac management    Atrial Fibrillation     Patient states she does not feel any symptoms of AFIB    LABS     Had labs  , results in chart . She will have labs in August 2025 per PCP    Medication     She accidentally took extra digoxin, xarelto and tramdol last night.     Med Refill     Needs refills on digoxin and xarelto-90 day     Subjective     HPI    Stefany Tellez is a 84 y.o. femalewith HTN, mild dyslipidemia, and significant arthritis referred for cardiac evaluation in August 2021. She denied CP but admitted to severe fatigue and SOB on exertion. Work up showed 65-70% of D1 and D2 which were 1mm vessels, too small for intervention. Normal EF. Medical management advised.      She returned November 2023 requesting cardiac clearance for surgery for malignant melanoma of vulva at .  She was noted to be in atrial fibrillation with controlled rate. Asymptomatic, therefore only Xarelto started.  Echo showed normal EF.  TSH and mag normal. After her surgery, she was noted to be in RVR.  Atenolol changed to sotalol. Then digoxin added and rate improved. She was admitted with UTI, confusion May 2024. CT head normal.      She returns today for follow up. She denies cardiac symptoms. No CP, dyspnea, palpitations. She is unaware of the atrial fibrillation.  She mistakenly took extra dose of digoxin Xarelto and tramadol last night.  Heart rate stable.  PET scan at  remains negative, most recent 3/25/25.  She was treated for UTI recently.  Diclofenac DC'd secondary to Xarelto.  Has started tramadol which has helped her arthritis pain.  Labs followed by Dr. Ariza. On 11/19/24: normal LDH, BUN/Cr 21/0.5, alk phos 172, H/H 14.5/44.6, plts 160. Digoxin level, TSH, mag not available. Lipids not available.     Cardiac History:    Past Surgical History:   Procedure Laterality Date    APPENDECTOMY      CARDIAC CATHETERIZATION  11/29/2021    65-70% Small D! & D2     CARDIOVASCULAR STRESS TEST  11/10/2021    Lexiscan- EF 61%. R/O Anterior Ischemia     SECTION      ECHO - CONVERTED  2021    EF 65%. Mild MR & AI. RVSP- 36 mmHg    ECHO - CONVERTED  2023    EF 60%. LA- 4.1 .Mild MR & AI. RVSP- 40 mmHg    HYSTERECTOMY       Current Outpatient Medications   Medication Sig Dispense Refill    amLODIPine (NORVASC) 5 MG tablet Take 1 tablet by mouth Daily.      digoxin (LANOXIN) 125 MCG tablet Take 1 tablet by mouth Daily. 90 tablet 3    docusate sodium (COLACE) 250 MG capsule Take 1 capsule by mouth Daily. 30 capsule 1    DULoxetine (CYMBALTA) 60 MG capsule Take 1 capsule by mouth Daily.      fluticasone (VERAMYST) 27.5 MCG/SPRAY nasal spray Administer 2 sprays into the nostril(s) as directed by provider Daily.      gabapentin (NEURONTIN) 300 MG capsule Take 1 capsule by mouth 3 (Three) Times a Day.      ipratropium (ATROVENT) 0.06 % nasal spray       lansoprazole (PREVACID) 30 MG capsule Take 1 capsule by mouth Daily.      lisinopril-hydrochlorothiazide (PRINZIDE,ZESTORETIC) 20-12.5 MG per tablet Take 1 tablet by mouth Daily.      Lutein 20 MG tablet Take  by mouth Daily.      multivitamin with minerals tablet tablet Take 1 tablet by mouth Daily.      Sotalol HCl AF 80 MG tablet TAKE 1 TABLET TWICE A  tablet 3    tamsulosin (FLOMAX) 0.4 MG capsule 24 hr capsule TAKE 1 CAPSULE BY MOUTH DAILY 90 capsule 3    traMADol (ULTRAM) 50 MG tablet Take 1 tablet by mouth 4 (Four) Times a Day.      Xarelto 20 MG tablet TAKE 1 TABLET DAILY 90 tablet 3    diclofenac (VOLTAREN) 75 MG EC tablet 1 tablet.      nitrofurantoin, macrocrystal-monohydrate, (Macrobid) 100 MG capsule Take 1 capsule by mouth Every 12 (Twelve) Hours. 14 capsule 0     No current facility-administered medications for this visit.     Codeine, Novocain [procaine], Sulfa antibiotics, and Chlorhexidine gluconate    Past Medical History:   Diagnosis Date    Arthritis     Asthma     Atrial fibrillation  2023    This was detected Wed at the pre op visit at [facility] while doing EKG to get clearance for Urgent Cancer surgery 23    Cancer     malignant melanoma    Chronic bronchitis     Depression     Diverticulitis     Fibromyalgia     GERD (gastroesophageal reflux disease)     History of back surgery     History of bilateral knee replacement     History of cholecystectomy     History of colonoscopy with polypectomy     History of hysterectomy     History of tonsillectomy     Hyperlipidemia     Hypertension     Migraines     Urinary incontinence     Urinary tract infection      Social History     Socioeconomic History    Marital status:    Tobacco Use    Smoking status: Former     Current packs/day: 0.00     Average packs/day: 1 pack/day for 15.0 years (15.0 ttl pk-yrs)     Types: Cigarettes     Start date: 1970     Quit date:      Years since quittin.4     Passive exposure: Past    Smokeless tobacco: Never   Vaping Use    Vaping status: Never Used   Substance and Sexual Activity    Alcohol use: Not Currently     Comment: Only a social drink after dinner occasionally, but not since    Drug use: Never    Sexual activity: Not Currently     Partners: Male     Birth control/protection: Diaphragm, Surgical     Family History   Problem Relation Age of Onset    Hypertension Mother     Lymphoma Mother     Cancer Mother     Heart attack Father     Heart disease Father     Alzheimer's disease Sister     Mitral valve prolapse Sister     Heart disease Maternal Grandfather     No Known Problems Son     No Known Problems Daughter      Review of Systems   Constitutional: Positive for malaise/fatigue and weight loss (-2). Negative for decreased appetite.   HENT: Negative.     Eyes:  Negative for blurred vision.   Cardiovascular:  Negative for chest pain, dyspnea on exertion, leg swelling, palpitations and syncope.   Respiratory:  Negative for shortness of breath and sleep disturbances due to  "breathing.    Endocrine: Negative.    Hematologic/Lymphatic: Negative for bleeding problem. Does not bruise/bleed easily.   Skin: Negative.    Musculoskeletal:  Positive for arthritis, back pain and falls. Negative for myalgias.   Gastrointestinal:  Negative for abdominal pain, heartburn and melena.   Genitourinary:  Negative for hematuria.   Neurological:  Negative for dizziness and light-headedness.   Psychiatric/Behavioral:  Negative for altered mental status.    Allergic/Immunologic: Negative.       Objective     /60 (BP Location: Left arm, Patient Position: Sitting, Cuff Size: Adult)   Pulse 84   Ht 152.4 cm (60\")   Wt 56.8 kg (125 lb 3.2 oz)   BMI 24.45 kg/m²     Vitals and nursing note reviewed.   Constitutional:       General: Not in acute distress.     Appearance: Well-developed. Not diaphoretic.   Eyes:      Pupils: Pupils are equal, round, and reactive to light.   HENT:      Head: Normocephalic.   Pulmonary:      Effort: Pulmonary effort is normal. No respiratory distress.      Breath sounds: Normal breath sounds.   Cardiovascular:      Normal rate. Regular rhythm.   Pulses:     Intact distal pulses.   Edema:     Peripheral edema absent.   Abdominal:      General: Bowel sounds are normal.      Palpations: Abdomen is soft.   Musculoskeletal: Normal range of motion.      Cervical back: Normal range of motion. Skin:     General: Skin is warm and dry.   Neurological:      Mental Status: Alert and oriented to person, place, and time.          ECG 12 Lead    Date/Time: 6/11/2025 4:20 PM  Performed by: Pati Dean APRN    Authorized by: Pati Dean APRN  Comparison: compared with previous ECG   Similar to previous ECG  Rhythm: atrial fibrillation  Rate: normal  BPM: 84  ST Segments: ST segments normal    Clinical impression: abnormal EKG  Comments: QTc 368 ms              Problem List Items Addressed This Visit          Cardiac and Vasculature    Hypercholesteremia - Primary    Essential " hypertension    Relevant Medications    lisinopril-hydrochlorothiazide (PRINZIDE,ZESTORETIC) 20-12.5 MG per tablet    Persistent atrial fibrillation    Coronary artery disease involving native coronary artery of native heart without angina pectoris      Atrial fibrillation  -persistent, rate controlled, asymptomatic   -EKG showed AF at 84 bpm, normal QT  -Continue Xarelto 20 mg, sotalol and digoxin   -consider checking mag, TSH and digoxin level next lab draw.     HTN  -BP well controlled  -Continue amlodipine, lisinopril/hctz      Dyslipidemia  -lipids followed by Dr. Ariza   -CMP, CBC stable ()   -she does not take statin       Weight loss  -appears to have stabilized  -Encourage protein intake   -Please add digoxin level to next lab draw, denies nausea or visual disturbance     CAD  -stenosis of small coronary artery  -continue medical management      Vulvar Ca  -following closely at   -PET reported as stable      Cardiac status appears stable. FU 6 months, sooner if needed.     BMI is within normal parameters. No other follow-up for BMI required.            Electronically signed by JIGAR Campos,  June 11, 2025 16:22 EDT

## 2025-06-23 ENCOUNTER — OFFICE VISIT (OUTPATIENT)
Dept: UROLOGY | Facility: CLINIC | Age: 85
End: 2025-06-23
Payer: MEDICARE

## 2025-06-23 VITALS
HEART RATE: 85 BPM | DIASTOLIC BLOOD PRESSURE: 72 MMHG | WEIGHT: 126 LBS | SYSTOLIC BLOOD PRESSURE: 120 MMHG | BODY MASS INDEX: 24.74 KG/M2 | HEIGHT: 60 IN

## 2025-06-23 DIAGNOSIS — R33.9 INCOMPLETE EMPTYING OF BLADDER: ICD-10-CM

## 2025-06-23 DIAGNOSIS — N39.0 URINARY TRACT INFECTION WITHOUT HEMATURIA, SITE UNSPECIFIED: Primary | ICD-10-CM

## 2025-06-23 DIAGNOSIS — K59.04 CHRONIC IDIOPATHIC CONSTIPATION: ICD-10-CM

## 2025-06-23 DIAGNOSIS — R35.0 FREQUENCY OF MICTURITION: ICD-10-CM

## 2025-06-23 LAB
BILIRUB BLD-MCNC: NEGATIVE MG/DL
CLARITY, POC: ABNORMAL
COLOR UR: ABNORMAL
EXPIRATION DATE: ABNORMAL
GLUCOSE UR STRIP-MCNC: NEGATIVE MG/DL
KETONES UR QL: NEGATIVE
LEUKOCYTE EST, POC: ABNORMAL
Lab: ABNORMAL
NITRITE UR-MCNC: NEGATIVE MG/ML
PH UR: 6 [PH] (ref 5–8)
PROT UR STRIP-MCNC: ABNORMAL MG/DL
RBC # UR STRIP: ABNORMAL /UL
SP GR UR: 1.01 (ref 1–1.03)
UROBILINOGEN UR QL: NORMAL

## 2025-06-23 PROCEDURE — 3078F DIAST BP <80 MM HG: CPT

## 2025-06-23 PROCEDURE — 87086 URINE CULTURE/COLONY COUNT: CPT

## 2025-06-23 PROCEDURE — 99214 OFFICE O/P EST MOD 30 MIN: CPT

## 2025-06-23 PROCEDURE — 1159F MED LIST DOCD IN RCRD: CPT

## 2025-06-23 PROCEDURE — 3074F SYST BP LT 130 MM HG: CPT

## 2025-06-23 PROCEDURE — 1160F RVW MEDS BY RX/DR IN RCRD: CPT

## 2025-06-23 PROCEDURE — 81003 URINALYSIS AUTO W/O SCOPE: CPT

## 2025-06-23 RX ORDER — DOCUSATE SODIUM 250 MG
250 CAPSULE ORAL DAILY
Qty: 90 CAPSULE | Refills: 3 | Status: SHIPPED | OUTPATIENT
Start: 2025-06-23 | End: 2025-06-25 | Stop reason: SDUPTHER

## 2025-06-23 RX ORDER — TAMSULOSIN HYDROCHLORIDE 0.4 MG/1
1 CAPSULE ORAL DAILY
Qty: 90 CAPSULE | Refills: 3 | Status: SHIPPED | OUTPATIENT
Start: 2025-06-23

## 2025-06-23 NOTE — PROGRESS NOTES
"Chief Complaint:    Chief Complaint   Patient presents with    Urinary Tract Infection       Vital Signs:   /72   Pulse 85   Ht 152.4 cm (60\")   Wt 57.2 kg (126 lb)   BMI 24.61 kg/m²   Body mass index is 24.61 kg/m².      HPI:  Stefany Tellez is a 84 y.o. female who presents today for initial evaluation     History of Present Illness  Ms. Tellez presents to the clinic for follow-up for weak urinary stream and urgency symptoms.  She has been referred to us by Shanel De La Rosa.  She reports that she was initially seen in gynecologist due to concerns of urinary incontinence and possible bladder prolapse and had a urogenital exam at that time.  She was found to have a malignant melanoma of the left vulva and underwent urgent surgical removal in [facility].  She has since had routine PET scans which showed no concerns of any metastatic disease.  She reports that since surgery she has had some weak stream and difficulty with urination.  She states she has to press on the right inner side of her thigh to help urinate at times.  She reports she was reexamined 2 months ago and advised everything appeared normal.  She does have some intermittent dysuria, frequency, and urgency. She denies any gross hematuria.  She does report chronic constipation and takes over-the-counter medications such as MiraLAX and other laxatives with some improvement.  Urine at last office was positive for an acute infection with iron thousand colony-forming units of Staphylococcus color gaze.  She was given appropriate antibiotics and has had some improvement.  She was also started on Flomax 0.4 mg once daily for LUTS.  She has been started on a probiotic and a stool softener with minimal improvement in bowel movements.  She does wish to try something else for her constipation.  Urine in office today shows 3+ leukocytes, trace protein, and trace blood.      Past Medical History:  Past Medical History:   Diagnosis Date    Arthritis     " Asthma     Atrial fibrillation 11-1-2023    This was detected Wed at the pre op visit at [facility] while doing EKG to get clearance for Urgent Cancer surgery 11-9-23    Cancer     malignant melanoma    Chronic bronchitis     Depression     Diverticulitis     Fibromyalgia     GERD (gastroesophageal reflux disease)     History of back surgery     History of bilateral knee replacement     History of cholecystectomy     History of colonoscopy with polypectomy     History of hysterectomy     History of tonsillectomy     Hyperlipidemia     Hypertension     Migraines     Urinary incontinence     Urinary tract infection        Current Meds:  Current Outpatient Medications   Medication Sig Dispense Refill    amLODIPine (NORVASC) 5 MG tablet Take 1 tablet by mouth Daily.      digoxin (LANOXIN) 125 MCG tablet Take 1 tablet by mouth Daily. 90 tablet 3    docusate sodium (COLACE) 250 MG capsule Take 1 capsule by mouth Daily. 90 capsule 3    DULoxetine (CYMBALTA) 60 MG capsule Take 1 capsule by mouth Daily.      fluticasone (VERAMYST) 27.5 MCG/SPRAY nasal spray Administer 2 sprays into the nostril(s) as directed by provider Daily.      gabapentin (NEURONTIN) 300 MG capsule Take 1 capsule by mouth 3 (Three) Times a Day.      ipratropium (ATROVENT) 0.06 % nasal spray       lansoprazole (PREVACID) 30 MG capsule Take 1 capsule by mouth Daily.      lisinopril-hydrochlorothiazide (PRINZIDE,ZESTORETIC) 20-12.5 MG per tablet Take 1 tablet by mouth Daily.      Lutein 20 MG tablet Take  by mouth Daily.      multivitamin with minerals tablet tablet Take 1 tablet by mouth Daily.      rivaroxaban (Xarelto) 20 MG tablet Take 1 tablet by mouth Daily. 90 tablet 3    Sotalol HCl AF 80 MG tablet TAKE 1 TABLET TWICE A  tablet 3    tamsulosin (FLOMAX) 0.4 MG capsule 24 hr capsule Take 1 capsule by mouth Daily. 90 capsule 3    traMADol (ULTRAM) 50 MG tablet Take 1 tablet by mouth 4 (Four) Times a Day.      linaclotide (Linzess) 145 MCG  capsule capsule Take 1 capsule by mouth Every Morning Before Breakfast. 90 capsule 3     No current facility-administered medications for this visit.        Allergies:   Allergies   Allergen Reactions    Codeine Nausea And Vomiting    Novocain [Procaine] Nausea And Vomiting    Sulfa Antibiotics Nausea And Vomiting    Chlorhexidine Gluconate Rash        Past Surgical History:  Past Surgical History:   Procedure Laterality Date    APPENDECTOMY      CARDIAC CATHETERIZATION  2021    65-70% Small D! & D2    CARDIOVASCULAR STRESS TEST  11/10/2021    Lexiscan- EF 61%. R/O Anterior Ischemia     SECTION      ECHO - CONVERTED  2021    EF 65%. Mild MR & AI. RVSP- 36 mmHg    ECHO - CONVERTED  2023    EF 60%. LA- 4.1 .Mild MR & AI. RVSP- 40 mmHg    HYSTERECTOMY         Social History:  Social History     Socioeconomic History    Marital status:    Tobacco Use    Smoking status: Former     Current packs/day: 0.00     Average packs/day: 1 pack/day for 15.0 years (15.0 ttl pk-yrs)     Types: Cigarettes     Start date: 1970     Quit date:      Years since quittin.5     Passive exposure: Past    Smokeless tobacco: Never   Vaping Use    Vaping status: Never Used   Substance and Sexual Activity    Alcohol use: Not Currently     Comment: Only a social drink after dinner occasionally, but not since    Drug use: Never    Sexual activity: Not Currently     Partners: Male     Birth control/protection: Diaphragm, Surgical       Family History:  Family History   Problem Relation Age of Onset    Hypertension Mother     Lymphoma Mother     Cancer Mother     Heart attack Father     Heart disease Father     Alzheimer's disease Sister     Mitral valve prolapse Sister     Heart disease Maternal Grandfather     No Known Problems Son     No Known Problems Daughter        Review of Systems:  Review of Systems   Constitutional:  Negative for fatigue, fever and unexpected weight change.   HENT:  Negative for  congestion.    Respiratory:  Negative for chest tightness and shortness of breath.    Cardiovascular:  Negative for chest pain.   Gastrointestinal:  Positive for constipation. Negative for abdominal pain, diarrhea, nausea and vomiting.   Genitourinary:  Positive for difficulty urinating, frequency and urgency. Negative for dysuria.   Skin:  Negative for rash.   Psychiatric/Behavioral:  Negative for confusion and suicidal ideas.        Physical Exam:  Physical Exam  Constitutional:       General: She is not in acute distress.     Appearance: Normal appearance.   HENT:      Head: Normocephalic and atraumatic.      Nose: Nose normal.      Mouth/Throat:      Mouth: Mucous membranes are moist.   Eyes:      Conjunctiva/sclera: Conjunctivae normal.   Cardiovascular:      Rate and Rhythm: Normal rate.      Pulses: Normal pulses.   Pulmonary:      Effort: Pulmonary effort is normal.   Abdominal:      Palpations: Abdomen is soft.   Genitourinary:     Comments: Denied urogenital exam at this time  Musculoskeletal:         General: Normal range of motion.      Cervical back: Normal range of motion.   Skin:     General: Skin is warm.   Neurological:      General: No focal deficit present.      Mental Status: She is alert and oriented to person, place, and time.   Psychiatric:         Mood and Affect: Mood normal.         Behavior: Behavior normal.         Thought Content: Thought content normal.         Judgment: Judgment normal.           Recent Image (CT and/or KUB):   CT Abdomen and Pelvis: No results found for this or any previous visit.     CT Stone Protocol: No results found for this or any previous visit.     KUB: No results found for this or any previous visit.       Labs:  Brief Urine Lab Results  (Last result in the past 365 days)        Color   Clarity   Blood   Leuk Est   Nitrite   Protein   CREAT   Urine HCG        06/23/25 1043 Bri   Slightly Cloudy   Trace   Large (3+)   Negative   Trace                 Office  Visit on 06/23/2025   Component Date Value Ref Range Status    Color 06/23/2025 Bri  Yellow, Straw, Dark Yellow, Bri Final    Clarity, UA 06/23/2025 Slightly Cloudy (A)  Clear Final    Specific Gravity  06/23/2025 1.015  1.005 - 1.030 Final    pH, Urine 06/23/2025 6.0  5.0 - 8.0 Final    Leukocytes 06/23/2025 Large (3+) (A)  Negative Final    Nitrite, UA 06/23/2025 Negative  Negative Final    Protein, POC 06/23/2025 Trace (A)  Negative mg/dL Final    Glucose, UA 06/23/2025 Negative  Negative mg/dL Final    Ketones, UA 06/23/2025 Negative  Negative Final    Urobilinogen, UA 06/23/2025 Normal  Normal, 0.2 E.U./dL Final    Bilirubin 06/23/2025 Negative  Negative Final    Blood, UA 06/23/2025 Trace (A)  Negative Final    Lot Number 06/23/2025 [id number]   Final    Expiration Date 06/23/2025 5/1/2026   Final    Urine Culture 06/23/2025 Culture in progress   Preliminary   Office Visit on 05/05/2025   Component Date Value Ref Range Status    Color 05/05/2025 Yellow  Yellow, Straw, Dark Yellow, Bri Final    Clarity, UA 05/05/2025 Cloudy (A)  Clear Final    Specific Gravity  05/05/2025 1.015  1.005 - 1.030 Final    pH, Urine 05/05/2025 6.0  5.0 - 8.0 Final    Leukocytes 05/05/2025 Large (3+) (A)  Negative Final    Nitrite, UA 05/05/2025 Positive (A)  Negative Final    Protein, POC 05/05/2025 1+ (A)  Negative mg/dL Final    Glucose, UA 05/05/2025 Negative  Negative mg/dL Final    Ketones, UA 05/05/2025 Negative  Negative Final    Urobilinogen, UA 05/05/2025 Normal  Normal, 0.2 E.U./dL Final    Bilirubin 05/05/2025 Negative  Negative Final    Blood, UA 05/05/2025 1+ (A)  Negative Final    Lot Number 05/05/2025 [id number]   Final    Expiration Date 05/05/2025 5/1/2026   Final    Urine Culture 05/05/2025 >100,000 CFU/mL Staphylococcus, coagulase negative (A)   Final        Procedure: None  Procedures     I have reviewed and agree with the above PMH, PSH, FMH, social history, medications, allergies, and labs.      Assessment/Plan:   Problem List Items Addressed This Visit       Urinary tract infection without hematuria - Primary    Relevant Orders    POC Urinalysis Dipstick, Automated (Completed)    Urine Culture - Urine, Urine, Random Void (Completed)    Chronic idiopathic constipation    Relevant Medications    docusate sodium (COLACE) 250 MG capsule    linaclotide (Linzess) 145 MCG capsule capsule    Incomplete emptying of bladder    Relevant Medications    tamsulosin (FLOMAX) 0.4 MG capsule 24 hr capsule     Other Visit Diagnoses         Frequency of micturition        Relevant Orders    POC Urinalysis Dipstick, Automated (Completed)    Urine Culture - Urine, Urine, Random Void (Completed)            Health Maintenance:   Health Maintenance Due   Topic Date Due    LIPID PANEL  Never done    DXA SCAN  Never done    TDAP/TD VACCINES (1 - Tdap) Never done    Pneumococcal Vaccine 50+ (1 of 1 - PCV) Never done    ZOSTER VACCINE (1 of 2) Never done    RSV Vaccine - Adults (1 - 1-dose 75+ series) Never done    ANNUAL WELLNESS VISIT  Never done    COVID-19 Vaccine (7 - 2024-25 season) 04/25/2025        Smoking Counseling: Former smoker.  Never used smokeless tobacco.  Counseling given.    Urine Incontinence: Patient reports that she is having very mild urinary incontinence.    Patient was given instructions and counseling regarding her condition or for health maintenance advice. Please see specific information pulled into the AVS if appropriate.    Patient Education:   Urinary tract infection -last urine culture was positive will continue for with observation at this time.  I will call with urine culture results once available.  Recommended continue with the daily probiotic to help with growth control of normal urogenital marylou.  She verbalized understanding.    Chronic idiopathic constipation  -patient has been on a probiotic as well as stool softener with minimal improvement.  We will start the patient on Linzess 145 mg  once daily for chronic idiopathic constipation.  Did discuss the risk of this medication in detail advised to discontinue if she begins to experience significant diarrhea or concerns of dehydration.  She verbalized understanding.  Incomplete emptying -discussed with the patient the pathophysiology of this in detail and she has been doing well on Flomax.  Did recommend a cystoscopy if symptoms do not improve or worsen.  She verbalized understanding we will place her back in roughly 3 months for reevaluation.    Visit Diagnoses:    ICD-10-CM ICD-9-CM   1. Urinary tract infection without hematuria, site unspecified  N39.0 599.0   2. Frequency of micturition  R35.0 788.41   3. Chronic idiopathic constipation  K59.04 564.00   4. Incomplete emptying of bladder  R33.9 788.21     A total of 30 minutes were spent coordinating this patient’s care in clinic today; 20 minutes of which were face-to-face with the patient, reviewing medical history and counseling on the current treatment and followup plan.  All questions were answered to patient's satisfaction.    Meds Ordered During Visit:  New Medications Ordered This Visit   Medications    docusate sodium (COLACE) 250 MG capsule     Sig: Take 1 capsule by mouth Daily.     Dispense:  90 capsule     Refill:  3    linaclotide (Linzess) 145 MCG capsule capsule     Sig: Take 1 capsule by mouth Every Morning Before Breakfast.     Dispense:  90 capsule     Refill:  3    tamsulosin (FLOMAX) 0.4 MG capsule 24 hr capsule     Sig: Take 1 capsule by mouth Daily.     Dispense:  90 capsule     Refill:  3     **Patient requests 90 days supply**       Follow Up Appointment: 3 months  No follow-ups on file.      This document has been electronically signed by Thai Mascorro PA-C   June 24, 2025 13:54 EDT    Part of this note may be an electronic transcription/translation of spoken language to printed text using the Dragon Dictation System.

## 2025-06-25 ENCOUNTER — RESULTS FOLLOW-UP (OUTPATIENT)
Dept: UROLOGY | Facility: CLINIC | Age: 85
End: 2025-06-25
Payer: MEDICARE

## 2025-06-25 DIAGNOSIS — K59.04 CHRONIC IDIOPATHIC CONSTIPATION: ICD-10-CM

## 2025-06-25 LAB — BACTERIA SPEC AEROBE CULT: NORMAL

## 2025-06-25 RX ORDER — DOCUSATE SODIUM 250 MG
250 CAPSULE ORAL DAILY
Qty: 90 CAPSULE | Refills: 3 | Status: SHIPPED | OUTPATIENT
Start: 2025-06-25

## 2025-06-25 NOTE — TELEPHONE ENCOUNTER
Called patient to discuss urine culture advise there was normal growth which showed no concerns of any infection.  She reports she is took Linzess previously cannot tolerate this so she is requesting refills on her docusate and milk of magnesia to [facility].  I will send these in.

## 2025-07-07 ENCOUNTER — LAB (OUTPATIENT)
Dept: UROLOGY | Facility: CLINIC | Age: 85
End: 2025-07-07
Payer: MEDICARE

## 2025-07-07 DIAGNOSIS — N39.0 URINARY TRACT INFECTION WITHOUT HEMATURIA, SITE UNSPECIFIED: Primary | ICD-10-CM

## 2025-07-07 LAB
BILIRUB BLD-MCNC: NEGATIVE MG/DL
CLARITY, POC: ABNORMAL
COLOR UR: YELLOW
EXPIRATION DATE: ABNORMAL
GLUCOSE UR STRIP-MCNC: NEGATIVE MG/DL
KETONES UR QL: NEGATIVE
LEUKOCYTE EST, POC: ABNORMAL
Lab: ABNORMAL
NITRITE UR-MCNC: NEGATIVE MG/ML
PH UR: 7 [PH] (ref 5–8)
PROT UR STRIP-MCNC: ABNORMAL MG/DL
RBC # UR STRIP: NEGATIVE /UL
SP GR UR: 1.01 (ref 1–1.03)
UROBILINOGEN UR QL: NORMAL

## 2025-07-07 PROCEDURE — 87086 URINE CULTURE/COLONY COUNT: CPT

## 2025-07-09 ENCOUNTER — RESULTS FOLLOW-UP (OUTPATIENT)
Dept: UROLOGY | Facility: CLINIC | Age: 85
End: 2025-07-09
Payer: MEDICARE

## 2025-07-09 LAB — BACTERIA SPEC AEROBE CULT: NORMAL

## 2025-07-09 NOTE — TELEPHONE ENCOUNTER
----- Message from Thai Mascorro sent at 7/9/2025  2:10 PM EDT -----  Please let patient know urine showed normal marylou.  We do not usually treat this unless she is symptomatic.  I recommend to keep follow-up or return to the clinic sooner if needed.  Thank you  ----- Message -----  From: Melinda Maciel MA  Sent: 7/7/2025  11:06 AM EDT  To: Thai Mascorro PA-C

## 2025-07-28 ENCOUNTER — TELEPHONE (OUTPATIENT)
Dept: UROLOGY | Facility: CLINIC | Age: 85
End: 2025-07-28
Payer: MEDICARE

## 2025-07-28 DIAGNOSIS — R33.9 INCOMPLETE EMPTYING OF BLADDER: ICD-10-CM

## 2025-07-28 RX ORDER — TAMSULOSIN HYDROCHLORIDE 0.4 MG/1
1 CAPSULE ORAL DAILY
Qty: 90 CAPSULE | Refills: 3 | Status: SHIPPED | OUTPATIENT
Start: 2025-07-28

## 2025-07-28 NOTE — TELEPHONE ENCOUNTER
Patients prescription for tamsulosin  was sent to [email or web address].  [facility].   DISPLAY PLAN FREE TEXT DISPLAY PLAN FREE TEXT DISPLAY PLAN FREE TEXT DISPLAY PLAN FREE TEXT